# Patient Record
Sex: MALE | Race: WHITE | NOT HISPANIC OR LATINO | Employment: FULL TIME | URBAN - NONMETROPOLITAN AREA
[De-identification: names, ages, dates, MRNs, and addresses within clinical notes are randomized per-mention and may not be internally consistent; named-entity substitution may affect disease eponyms.]

---

## 2023-07-09 ENCOUNTER — HOSPITAL ENCOUNTER (EMERGENCY)
Facility: HOSPITAL | Age: 43
Discharge: PSYCHIATRIC OR SUBSTANCE USE FACILITY | DRG: 897 | End: 2023-07-10
Attending: STUDENT IN AN ORGANIZED HEALTH CARE EDUCATION/TRAINING PROGRAM | Admitting: STUDENT IN AN ORGANIZED HEALTH CARE EDUCATION/TRAINING PROGRAM
Payer: MEDICAID

## 2023-07-09 DIAGNOSIS — F10.920 ALCOHOLIC INTOXICATION WITHOUT COMPLICATION: Primary | ICD-10-CM

## 2023-07-09 DIAGNOSIS — R45.851 SUICIDAL IDEATION: ICD-10-CM

## 2023-07-09 LAB
ALBUMIN SERPL-MCNC: 4.5 G/DL (ref 3.5–5.2)
ALBUMIN/GLOB SERPL: 1.1 G/DL
ALP SERPL-CCNC: 105 U/L (ref 39–117)
ALT SERPL W P-5'-P-CCNC: 26 U/L (ref 1–41)
AMPHET+METHAMPHET UR QL: NEGATIVE
AMPHETAMINES UR QL: NEGATIVE
ANION GAP SERPL CALCULATED.3IONS-SCNC: 16.5 MMOL/L (ref 5–15)
AST SERPL-CCNC: 117 U/L (ref 1–40)
BARBITURATES UR QL SCN: NEGATIVE
BASOPHILS # BLD AUTO: 0.09 10*3/MM3 (ref 0–0.2)
BASOPHILS NFR BLD AUTO: 1.1 % (ref 0–1.5)
BENZODIAZ UR QL SCN: NEGATIVE
BILIRUB SERPL-MCNC: 0.4 MG/DL (ref 0–1.2)
BILIRUB UR QL STRIP: NEGATIVE
BUN SERPL-MCNC: 10 MG/DL (ref 6–20)
BUN/CREAT SERPL: 15.6 (ref 7–25)
BUPRENORPHINE SERPL-MCNC: NEGATIVE NG/ML
CALCIUM SPEC-SCNC: 9.4 MG/DL (ref 8.6–10.5)
CANNABINOIDS SERPL QL: NEGATIVE
CHLORIDE SERPL-SCNC: 102 MMOL/L (ref 98–107)
CLARITY UR: CLEAR
CO2 SERPL-SCNC: 24.5 MMOL/L (ref 22–29)
COCAINE UR QL: NEGATIVE
COLOR UR: YELLOW
CREAT SERPL-MCNC: 0.64 MG/DL (ref 0.76–1.27)
DEPRECATED RDW RBC AUTO: 49.1 FL (ref 37–54)
EGFRCR SERPLBLD CKD-EPI 2021: 120.5 ML/MIN/1.73
EOSINOPHIL # BLD AUTO: 0.15 10*3/MM3 (ref 0–0.4)
EOSINOPHIL NFR BLD AUTO: 1.8 % (ref 0.3–6.2)
ERYTHROCYTE [DISTWIDTH] IN BLOOD BY AUTOMATED COUNT: 13.1 % (ref 12.3–15.4)
ETHANOL BLD-MCNC: 326 MG/DL (ref 0–10)
ETHANOL UR QL: 0.33 %
FENTANYL UR-MCNC: NEGATIVE NG/ML
FLUAV RNA RESP QL NAA+PROBE: NOT DETECTED
FLUBV RNA ISLT QL NAA+PROBE: NOT DETECTED
GLOBULIN UR ELPH-MCNC: 4 GM/DL
GLUCOSE SERPL-MCNC: 85 MG/DL (ref 65–99)
GLUCOSE UR STRIP-MCNC: NEGATIVE MG/DL
HCT VFR BLD AUTO: 46.4 % (ref 37.5–51)
HGB BLD-MCNC: 15.4 G/DL (ref 13–17.7)
HGB UR QL STRIP.AUTO: NEGATIVE
IMM GRANULOCYTES # BLD AUTO: 0.02 10*3/MM3 (ref 0–0.05)
IMM GRANULOCYTES NFR BLD AUTO: 0.2 % (ref 0–0.5)
KETONES UR QL STRIP: NEGATIVE
LEUKOCYTE ESTERASE UR QL STRIP.AUTO: NEGATIVE
LYMPHOCYTES # BLD AUTO: 3.13 10*3/MM3 (ref 0.7–3.1)
LYMPHOCYTES NFR BLD AUTO: 36.9 % (ref 19.6–45.3)
MCH RBC QN AUTO: 33.6 PG (ref 26.6–33)
MCHC RBC AUTO-ENTMCNC: 33.2 G/DL (ref 31.5–35.7)
MCV RBC AUTO: 101.1 FL (ref 79–97)
METHADONE UR QL SCN: NEGATIVE
MONOCYTES # BLD AUTO: 0.37 10*3/MM3 (ref 0.1–0.9)
MONOCYTES NFR BLD AUTO: 4.4 % (ref 5–12)
NEUTROPHILS NFR BLD AUTO: 4.73 10*3/MM3 (ref 1.7–7)
NEUTROPHILS NFR BLD AUTO: 55.6 % (ref 42.7–76)
NITRITE UR QL STRIP: NEGATIVE
NRBC BLD AUTO-RTO: 0 /100 WBC (ref 0–0.2)
OPIATES UR QL: NEGATIVE
OXYCODONE UR QL SCN: NEGATIVE
PCP UR QL SCN: NEGATIVE
PH UR STRIP.AUTO: 5.5 [PH] (ref 5–8)
PLATELET # BLD AUTO: 223 10*3/MM3 (ref 140–450)
PMV BLD AUTO: 8.8 FL (ref 6–12)
POTASSIUM SERPL-SCNC: 4 MMOL/L (ref 3.5–5.2)
PROPOXYPH UR QL: NEGATIVE
PROT SERPL-MCNC: 8.5 G/DL (ref 6–8.5)
PROT UR QL STRIP: NEGATIVE
RBC # BLD AUTO: 4.59 10*6/MM3 (ref 4.14–5.8)
SARS-COV-2 RNA RESP QL NAA+PROBE: NOT DETECTED
SODIUM SERPL-SCNC: 143 MMOL/L (ref 136–145)
SP GR UR STRIP: 1.01 (ref 1–1.03)
TRICYCLICS UR QL SCN: NEGATIVE
UROBILINOGEN UR QL STRIP: NORMAL
WBC NRBC COR # BLD: 8.49 10*3/MM3 (ref 3.4–10.8)

## 2023-07-09 PROCEDURE — 81003 URINALYSIS AUTO W/O SCOPE: CPT | Performed by: STUDENT IN AN ORGANIZED HEALTH CARE EDUCATION/TRAINING PROGRAM

## 2023-07-09 PROCEDURE — 36415 COLL VENOUS BLD VENIPUNCTURE: CPT

## 2023-07-09 PROCEDURE — 87636 SARSCOV2 & INF A&B AMP PRB: CPT | Performed by: STUDENT IN AN ORGANIZED HEALTH CARE EDUCATION/TRAINING PROGRAM

## 2023-07-09 PROCEDURE — 96372 THER/PROPH/DIAG INJ SC/IM: CPT

## 2023-07-09 PROCEDURE — 99285 EMERGENCY DEPT VISIT HI MDM: CPT

## 2023-07-09 PROCEDURE — 80053 COMPREHEN METABOLIC PANEL: CPT | Performed by: STUDENT IN AN ORGANIZED HEALTH CARE EDUCATION/TRAINING PROGRAM

## 2023-07-09 PROCEDURE — 25010000002 CYANOCOBALAMIN PER 1000 MCG: Performed by: STUDENT IN AN ORGANIZED HEALTH CARE EDUCATION/TRAINING PROGRAM

## 2023-07-09 PROCEDURE — 80307 DRUG TEST PRSMV CHEM ANLYZR: CPT | Performed by: STUDENT IN AN ORGANIZED HEALTH CARE EDUCATION/TRAINING PROGRAM

## 2023-07-09 PROCEDURE — 82077 ASSAY SPEC XCP UR&BREATH IA: CPT | Performed by: STUDENT IN AN ORGANIZED HEALTH CARE EDUCATION/TRAINING PROGRAM

## 2023-07-09 PROCEDURE — 85025 COMPLETE CBC W/AUTO DIFF WBC: CPT | Performed by: STUDENT IN AN ORGANIZED HEALTH CARE EDUCATION/TRAINING PROGRAM

## 2023-07-09 RX ORDER — FOLIC ACID 1 MG/1
1 TABLET ORAL ONCE
Status: COMPLETED | OUTPATIENT
Start: 2023-07-09 | End: 2023-07-09

## 2023-07-09 RX ORDER — CYANOCOBALAMIN 1000 UG/ML
1000 INJECTION, SOLUTION INTRAMUSCULAR; SUBCUTANEOUS ONCE
Status: COMPLETED | OUTPATIENT
Start: 2023-07-09 | End: 2023-07-09

## 2023-07-09 RX ORDER — DIPHENOXYLATE HYDROCHLORIDE AND ATROPINE SULFATE 2.5; .025 MG/1; MG/1
1 TABLET ORAL ONCE
Status: COMPLETED | OUTPATIENT
Start: 2023-07-09 | End: 2023-07-09

## 2023-07-09 RX ORDER — METHION/INOS/CHOL BT/B COM/LIV 110MG-86MG
250 CAPSULE ORAL ONCE
Status: COMPLETED | OUTPATIENT
Start: 2023-07-09 | End: 2023-07-09

## 2023-07-09 RX ORDER — NICOTINE 21 MG/24HR
1 PATCH, TRANSDERMAL 24 HOURS TRANSDERMAL ONCE
Status: DISCONTINUED | OUTPATIENT
Start: 2023-07-10 | End: 2023-07-10 | Stop reason: HOSPADM

## 2023-07-09 RX ADMIN — Medication 1 MG: at 20:18

## 2023-07-09 RX ADMIN — Medication 1 TABLET: at 20:18

## 2023-07-09 RX ADMIN — Medication 250 MG: at 20:18

## 2023-07-09 RX ADMIN — CYANOCOBALAMIN 1000 MCG: 1000 INJECTION, SOLUTION INTRAMUSCULAR at 20:20

## 2023-07-09 RX ADMIN — MAGNESIUM GLUCONATE 500 MG ORAL TABLET 400 MG: 500 TABLET ORAL at 20:18

## 2023-07-09 NOTE — ED PROVIDER NOTES
[facility]  emergency department encounter        Pt Name: Yadiel Silva  MRN: [id number]  Birthdate 1980  Date of evaluation: 7/11/2023    Chief Complaint   Patient presents with    Alcohol Problem    Suicidal             HISTORY OF PRESENT ILLNESS:   Yadiel Silva is a 43 y.o. male presents for detox from alcohol, Suboxone and endorsing suicidal ideation.    Patient reports he drinks 1/5 of liquor daily, last drink 30 minutes prior to arrival.  Patient has been on prescribed Suboxone for the last few years, denies illicit drug use.  Patient has down titrated his Suboxone from 8 mg to 2 mg over past 2 couple weeks and discontinued Suboxone completely 5 days ago.  Patient endorses cold feet, blurred vision but otherwise has no active ROS complaints.  Patient denies prior self-harm but endorses plan to commit suicide and that he even bought paracord to hang himself.                PCP: Provider, No Known          REVIEW OF SYSTEMS:     Review of Systems   Constitutional:  Negative for fever.        Cold feet   HENT:  Negative for congestion and rhinorrhea.    Eyes:  Positive for visual disturbance.   Respiratory:  Negative for cough and shortness of breath.    Cardiovascular:  Negative for chest pain.   Gastrointestinal:  Negative for abdominal pain, nausea and vomiting.   Genitourinary:  Negative for dysuria.   Musculoskeletal:  Negative for myalgias.   Skin:  Negative for rash.   Neurological:  Negative for headaches.   Psychiatric/Behavioral:  Negative for confusion.      Review of systems otherwise as per HPI.          PREVIOUS HISTORY:         Past Medical History:   Diagnosis Date    Alcohol abuse     Withdrawal symptoms, alcohol            Past Surgical History:   Procedure Laterality Date    FOREIGN BODY REMOVAL      exploratory surgery in neck to remove glass 2002           Social History     Socioeconomic History    Marital status:    Tobacco Use    Smoking status: Never     Smokeless tobacco: Current     Types: Snuff   Vaping Use    Vaping Use: Never used   Substance and Sexual Activity    Alcohol use: Yes     Comment: 5th or more daily x 4-5 yrs. Last drink 7/9/23    Drug use: Yes     Comment: Suboxone    Sexual activity: Defer           Family History   Problem Relation Age of Onset    Alcohol abuse Father     Seizures Sister     Drug abuse Other          No current outpatient medications      Allergies:  Patient has no known allergies.          PHYSICAL EXAM:     Patient Vitals for the past 24 hrs:   BP Temp Temp src Pulse Resp SpO2   07/10/23 0700 121/81 -- -- 88 -- --   07/10/23 0601 122/86 97.9 °F (36.6 °C) Infrared 96 17 100 %       Physical Exam  Vitals and nursing note reviewed.   Constitutional:       General: He is not in acute distress.     Appearance: Normal appearance.   HENT:      Head: Normocephalic and atraumatic.   Eyes:      Extraocular Movements: Extraocular movements intact.      Conjunctiva/sclera: Conjunctivae normal.   Pulmonary:      Effort: Pulmonary effort is normal. No respiratory distress.   Abdominal:      General: Abdomen is flat. There is no distension.   Musculoskeletal:         General: No deformity. Normal range of motion.      Cervical back: Normal range of motion. No rigidity.   Skin:     Coloration: Skin is not jaundiced.      Findings: No rash.   Neurological:      General: No focal deficit present.      Mental Status: He is alert and oriented to person, place, and time.      Comments: Mildly slurred speech   Psychiatric:         Mood and Affect: Affect is not tearful.         Behavior: Behavior is cooperative.         Thought Content: Thought content includes suicidal ideation. Thought content includes suicidal plan.           COMPLETED DIAGNOSTIC STUDIES AND INTERVENTIONS:     Lab Results (last 24 hours)       Procedure Component Value Units Date/Time    Ethanol [[id number]]  (Abnormal) Collected: 07/10/23 0553    Specimen: Blood Updated:  07/10/23 0611     Ethanol 64 mg/dL      Ethanol % 0.064 %     Narrative:      >/= 80.0 legally intoxicated    Magnesium [[id number]]  (Normal) Collected: 07/10/23 0553    Specimen: Blood Updated: 07/10/23 0722     Magnesium 2.0 mg/dL               No orders to display         New Medications Ordered This Visit   Medications    thiamine (VITAMIN B-1) tablet 250 mg    folic acid (FOLVITE) tablet 1 mg    cyanocobalamin injection 1,000 mcg    magnesium oxide (MAG-OX) tablet 400 mg    multivitamin (THERAGRAN) tablet 1 tablet    LORazepam (ATIVAN) tablet 2 mg         Procedures            MEDICAL DECISION-MAKING AND ED COURSE:     ED Course as of 07/11/23 0449   Sun Jul 09, 2023 1955 43-year-old male presents intoxicated for detox from alcohol, Suboxone and endorsing suicidal ideation.  Patient has titrated down to Suboxone over the past couple of weeks.  Administer thiamine, vitamin replacement.  Labs pending.  No significant symptoms of withdrawal. [KP]   2045 Ethanol %: 0.326 [KP]   Mon Jul 10, 2023   0417 Ethanol %: 0.113 [KP]   Tue Jul 11, 2023 0448 Patient admitted to psych/detox. [KP]      ED Course User Index  [KP] Ernst Cantor MD       ?      FINAL IMPRESSION:       1. Alcoholic intoxication without complication    2. Suicidal ideation         The complaints listed here are new problems to this examiner.       Medication List      No changes were made to your prescriptions during this visit.         FOLLOW-UP  No follow-up provider specified.    DISPOSITION  ED Disposition       ED Disposition   DC/Transfer to Behavioral Health Condition   Stable    Comment   --                   Please note that portions of this note were completed with a voice recognition program.      Ernst Cantor MD  04:49 EDT  7/11/2023               Ernst Cantor MD  07/11/23 0449

## 2023-07-10 ENCOUNTER — HOSPITAL ENCOUNTER (INPATIENT)
Facility: HOSPITAL | Age: 43
LOS: 5 days | Discharge: HOME | DRG: 897 | End: 2023-07-15
Attending: PSYCHIATRY & NEUROLOGY | Admitting: PSYCHIATRY & NEUROLOGY
Payer: MEDICAID

## 2023-07-10 VITALS
HEART RATE: 88 BPM | OXYGEN SATURATION: 100 % | RESPIRATION RATE: 17 BRPM | WEIGHT: 138 LBS | SYSTOLIC BLOOD PRESSURE: 121 MMHG | DIASTOLIC BLOOD PRESSURE: 81 MMHG | HEIGHT: 68 IN | TEMPERATURE: 97.9 F | BODY MASS INDEX: 20.92 KG/M2

## 2023-07-10 PROBLEM — F19.10 POLYSUBSTANCE ABUSE: Status: ACTIVE | Noted: 2023-07-10

## 2023-07-10 PROBLEM — F11.20 OPIOID USE DISORDER, SEVERE, DEPENDENCE: Status: ACTIVE | Noted: 2023-07-10

## 2023-07-10 PROBLEM — F10.20 ALCOHOL USE DISORDER, SEVERE, DEPENDENCE: Status: ACTIVE | Noted: 2023-07-10

## 2023-07-10 LAB
ETHANOL BLD-MCNC: 113 MG/DL (ref 0–10)
ETHANOL BLD-MCNC: 64 MG/DL (ref 0–10)
ETHANOL UR QL: 0.06 %
ETHANOL UR QL: 0.11 %
MAGNESIUM SERPL-MCNC: 2 MG/DL (ref 1.6–2.6)
QT INTERVAL: 362 MS
QTC INTERVAL: 445 MS

## 2023-07-10 PROCEDURE — 99222 1ST HOSP IP/OBS MODERATE 55: CPT | Performed by: PSYCHIATRY & NEUROLOGY

## 2023-07-10 PROCEDURE — 93005 ELECTROCARDIOGRAM TRACING: CPT | Performed by: PSYCHIATRY & NEUROLOGY

## 2023-07-10 PROCEDURE — 83735 ASSAY OF MAGNESIUM: CPT | Performed by: STUDENT IN AN ORGANIZED HEALTH CARE EDUCATION/TRAINING PROGRAM

## 2023-07-10 PROCEDURE — 63710000001 ONDANSETRON PER 8 MG: Performed by: PSYCHIATRY & NEUROLOGY

## 2023-07-10 PROCEDURE — 82077 ASSAY SPEC XCP UR&BREATH IA: CPT | Performed by: STUDENT IN AN ORGANIZED HEALTH CARE EDUCATION/TRAINING PROGRAM

## 2023-07-10 RX ORDER — FAMOTIDINE 20 MG/1
20 TABLET, FILM COATED ORAL 2 TIMES DAILY PRN
Status: DISCONTINUED | OUTPATIENT
Start: 2023-07-10 | End: 2023-07-15 | Stop reason: HOSPADM

## 2023-07-10 RX ORDER — LORAZEPAM 2 MG/1
2 TABLET ORAL ONCE
Status: COMPLETED | OUTPATIENT
Start: 2023-07-10 | End: 2023-07-10

## 2023-07-10 RX ORDER — LORAZEPAM 2 MG/1
2 TABLET ORAL
Status: DISPENSED | OUTPATIENT
Start: 2023-07-10 | End: 2023-07-11

## 2023-07-10 RX ORDER — LORAZEPAM 0.5 MG/1
0.5 TABLET ORAL EVERY 4 HOURS PRN
Status: ACTIVE | OUTPATIENT
Start: 2023-07-14 | End: 2023-07-15

## 2023-07-10 RX ORDER — TRAZODONE HYDROCHLORIDE 50 MG/1
50 TABLET ORAL NIGHTLY PRN
Status: DISCONTINUED | OUTPATIENT
Start: 2023-07-10 | End: 2023-07-15 | Stop reason: HOSPADM

## 2023-07-10 RX ORDER — BENZTROPINE MESYLATE 1 MG/ML
1 INJECTION INTRAMUSCULAR; INTRAVENOUS ONCE AS NEEDED
Status: DISCONTINUED | OUTPATIENT
Start: 2023-07-10 | End: 2023-07-15 | Stop reason: HOSPADM

## 2023-07-10 RX ORDER — ONDANSETRON 4 MG/1
4 TABLET, FILM COATED ORAL EVERY 6 HOURS PRN
Status: DISCONTINUED | OUTPATIENT
Start: 2023-07-10 | End: 2023-07-15 | Stop reason: HOSPADM

## 2023-07-10 RX ORDER — BENZTROPINE MESYLATE 1 MG/1
2 TABLET ORAL ONCE AS NEEDED
Status: DISCONTINUED | OUTPATIENT
Start: 2023-07-10 | End: 2023-07-15 | Stop reason: HOSPADM

## 2023-07-10 RX ORDER — LORAZEPAM 1 MG/1
1 TABLET ORAL
Status: COMPLETED | OUTPATIENT
Start: 2023-07-13 | End: 2023-07-13

## 2023-07-10 RX ORDER — MULTIVITAMIN WITH IRON
2 TABLET ORAL DAILY
Status: DISCONTINUED | OUTPATIENT
Start: 2023-07-10 | End: 2023-07-15 | Stop reason: HOSPADM

## 2023-07-10 RX ORDER — ECHINACEA PURPUREA EXTRACT 125 MG
2 TABLET ORAL AS NEEDED
Status: DISCONTINUED | OUTPATIENT
Start: 2023-07-10 | End: 2023-07-15 | Stop reason: HOSPADM

## 2023-07-10 RX ORDER — LOPERAMIDE HYDROCHLORIDE 2 MG/1
2 CAPSULE ORAL
Status: DISCONTINUED | OUTPATIENT
Start: 2023-07-10 | End: 2023-07-15 | Stop reason: HOSPADM

## 2023-07-10 RX ORDER — LORAZEPAM 2 MG/1
2 TABLET ORAL EVERY 4 HOURS PRN
Status: ACTIVE | OUTPATIENT
Start: 2023-07-11 | End: 2023-07-12

## 2023-07-10 RX ORDER — ALUMINA, MAGNESIA, AND SIMETHICONE 2400; 2400; 240 MG/30ML; MG/30ML; MG/30ML
15 SUSPENSION ORAL EVERY 6 HOURS PRN
Status: DISCONTINUED | OUTPATIENT
Start: 2023-07-10 | End: 2023-07-15 | Stop reason: HOSPADM

## 2023-07-10 RX ORDER — LORAZEPAM 1 MG/1
1 TABLET ORAL EVERY 4 HOURS PRN
Status: ACTIVE | OUTPATIENT
Start: 2023-07-13 | End: 2023-07-14

## 2023-07-10 RX ORDER — MULTIPLE VITAMINS W/ MINERALS TAB 9MG-400MCG
1 TAB ORAL DAILY
Status: DISCONTINUED | OUTPATIENT
Start: 2023-07-10 | End: 2023-07-15 | Stop reason: HOSPADM

## 2023-07-10 RX ORDER — HYDROXYZINE 50 MG/1
50 TABLET, FILM COATED ORAL EVERY 6 HOURS PRN
Status: DISCONTINUED | OUTPATIENT
Start: 2023-07-10 | End: 2023-07-15 | Stop reason: HOSPADM

## 2023-07-10 RX ORDER — BENZONATATE 100 MG/1
100 CAPSULE ORAL 3 TIMES DAILY PRN
Status: DISCONTINUED | OUTPATIENT
Start: 2023-07-10 | End: 2023-07-15 | Stop reason: HOSPADM

## 2023-07-10 RX ORDER — LORAZEPAM 0.5 MG/1
0.5 TABLET ORAL
Status: COMPLETED | OUTPATIENT
Start: 2023-07-14 | End: 2023-07-14

## 2023-07-10 RX ORDER — LORAZEPAM 2 MG/1
2 TABLET ORAL
Status: COMPLETED | OUTPATIENT
Start: 2023-07-11 | End: 2023-07-11

## 2023-07-10 RX ORDER — IBUPROFEN 400 MG/1
400 TABLET ORAL EVERY 6 HOURS PRN
Status: DISCONTINUED | OUTPATIENT
Start: 2023-07-10 | End: 2023-07-15 | Stop reason: HOSPADM

## 2023-07-10 RX ORDER — ACETAMINOPHEN 325 MG/1
650 TABLET ORAL EVERY 6 HOURS PRN
Status: DISCONTINUED | OUTPATIENT
Start: 2023-07-10 | End: 2023-07-15 | Stop reason: HOSPADM

## 2023-07-10 RX ADMIN — HYDROXYZINE HYDROCHLORIDE 50 MG: 50 TABLET ORAL at 14:22

## 2023-07-10 RX ADMIN — LORAZEPAM 2 MG: 2 TABLET ORAL at 01:45

## 2023-07-10 RX ADMIN — Medication 2 TABLET: at 08:34

## 2023-07-10 RX ADMIN — HYDROXYZINE HYDROCHLORIDE 50 MG: 50 TABLET ORAL at 08:34

## 2023-07-10 RX ADMIN — ONDANSETRON HYDROCHLORIDE 4 MG: 4 TABLET, FILM COATED ORAL at 08:34

## 2023-07-10 RX ADMIN — Medication 1 PATCH: at 00:15

## 2023-07-10 RX ADMIN — Medication 100 MG: at 08:35

## 2023-07-10 RX ADMIN — Medication 1 TABLET: at 08:34

## 2023-07-10 RX ADMIN — LORAZEPAM 2 MG: 2 TABLET ORAL at 14:22

## 2023-07-10 NOTE — ED NOTES
Relieved ED tech for 1 on 1 observation, aware of suicide precautions, 15 min safety checks will be performed.

## 2023-07-10 NOTE — NURSING NOTE
Presented pt to Dr. Lamar. New order to admit patient to CD on observation status. SP4. Routine orders. Rbovx2.

## 2023-07-10 NOTE — NURSING NOTE
Pt to intake. Search completed in ED with 2 staff members present. Pt educated about mask and encouraged to keep mask on in intake area if having signs/symptoms of COVID. Items placed in cabinet prior to pt coming to Intake. Pt to be assessed when ETOH redraw results.

## 2023-07-10 NOTE — PLAN OF CARE
Problem: Adult Behavioral Health Plan of Care  Goal: Plan of Care Review  Outcome: Ongoing, Progressing  Flowsheets (Taken 7/10/2023 1044)  Progress: no change  Plan of Care Reviewed With: patient  Patient Agreement with Plan of Care: agrees  Outcome Evaluation: New admit   Goal Outcome Evaluation:  Plan of Care Reviewed With: patient  Patient Agreement with Plan of Care: agrees     Progress: no change  Outcome Evaluation: New admit

## 2023-07-10 NOTE — ED NOTES
Pt reports double-vision, provider and RN currently in acuity 1 emergency, will report symptoms when available.

## 2023-07-10 NOTE — NURSING NOTE
"Intake assessment completed at this time. Pt presents to Intake after being medically cleared by ED. Pt brought to Intake by daughter for detox from Suboxone and alcohol. Pt states he had planned to not drink, but he just couldn't stand the withdrawals. Pt is beginning to feel the withdrawals from the Suboxone now, as he states they take 4-5 days to \"kick in\". Pt reports drinking a 5th or more of alcohol daily since his divorce in 2019 and taking a quarter of a Suboxone tab \"for the last couple years\". Last used Suboxone on Wed, 7/5/23. Last drink was prior to arrival to ED. Pt states \"When I got here, I said I was gonna kill myself, but I think it was just over the anxiety of coming here. I don't feel that way anymore. I bought paracord and stuff to hang myself in 2019 and I still have it, but my son lives with me and if I hung myself he would find me. That's not something a kid needs to see. Also, my daughter told me she had a dream that I killed myself.\" Pt reports sometimes wishing to be dead or to just have this all over, but denies any recent thoughts of taking his own life, homicide, or hallucinations, denies any Hx of psychiatric illness or DTs. Pt states his brother is helping him get off alcohol and spoke with a rehab in [place], but they said he needs detox before he can go. Pt unsure of the name of the rehab, but says his brother has the information and that's where he plans to go on discharge.    Labs    ETOH 64 down from 326    Anxiety 3 depression 3 craving 8 on scale of 0-10.  Sleep & appetite poor.  Denies any SI/HI/AVH.     COWS 7  CIWA 5    Meds given B-12, folic acid, Ativan 2mg, Mag-Ox, multivitamin, thiamine, and Nicotine patch on Rt arm.     Pt A&Ox 4.  "

## 2023-07-10 NOTE — H&P
INITIAL PSYCHIATRIC HISTORY & PHYSICAL    Patient Identification:  Name:  Yadiel Silva  Age:  43 y.o.  Sex:  male  :  1980  MRN:  [id number]   Visit Number:  [id number]  Primary Care Physician:  Provider, No Known    SUBJECTIVE    CC/Focus of Exam: alcohol and opioid use    HPI: Yadiel Silva is a 43 y.o. male who was admitted on 7/10/2023 with complaints of alcohol and opioid use and withdrawals. The patient reports a long history of substance use. First use was at age 16 when he started using marijuana, and would drink occasionally at that time and started taking pain pills in early 20s. Over time the use increased and the patient  continued to use despite negative consequences. The patient endorses symptoms of tolerance and withdrawals. Has tried to cut down and stop but has not been successful. Spends too much time and resources in pursuit of substance use. Longest period of sobriety is reported to be 5 days..  Currently using a fifth of Sen Bean daily, and about 2 mg of Suboxone daily orally.   Last use of alcohol was last night about 15 hours ago, and Suboxone 2 mg was about five days ago.   Withdrawal symptoms mild tremors, chills.     PAST PSYCHIATRIC HX: None reported.     SUBSTANCE USE HX: See HPI.     SOCIAL HX:   Social History     Socioeconomic History    Marital status:    Tobacco Use    Smoking status: Never    Smokeless tobacco: Current     Types: Snuff   Vaping Use    Vaping Use: Never used   Substance and Sexual Activity    Alcohol use: Yes     Comment: 5th or more daily x 4-5 yrs. Last drink 23    Drug use: Yes     Comment: Suboxone    Sexual activity: Defer         Past Medical History:   Diagnosis Date    Alcohol abuse     Withdrawal symptoms, alcohol           Past Surgical History:   Procedure Laterality Date    FOREIGN BODY REMOVAL      exploratory surgery in neck to remove glass        Family History   Problem Relation Age of Onset    Alcohol abuse Father      Seizures Sister     Drug abuse Other          No medications prior to admission.         ALLERGIES:  Patient has no known allergies.    Temp:  [97.9 °F (36.6 °C)-98.3 °F (36.8 °C)] 98.3 °F (36.8 °C)  Heart Rate:  [73-96] 95  Resp:  [16-18] 18  BP: ()/(57-99) 138/94    REVIEW OF SYSTEMS:  Review of Systems   Constitutional:  Positive for chills and diaphoresis.   HENT: Negative.     Eyes: Negative.    Respiratory: Negative.     Cardiovascular: Negative.    Gastrointestinal: Negative.    Endocrine: Negative.    Genitourinary: Negative.    Musculoskeletal: Negative.    Skin: Negative.    Neurological:  Positive for tremors.   Hematological: Negative.    Psychiatric/Behavioral: Negative.  Positive for dysphoric mood. The patient is nervous/anxious.       OBJECTIVE    PHYSICAL EXAM:  Physical Exam  Constitutional:  Appears well-developed and well-nourished.   HENT:   Head: Normocephalic and atraumatic.   Right Ear: External ear normal.   Left Ear: External ear normal.   Mouth/Throat: Oropharynx is clear and moist.   Eyes: Pupils are equal, round, and reactive to light. Conjunctivae and EOM are normal.   Neck: Normal range of motion. Neck supple.   Cardiovascular: Normal rate, regular rhythm and normal heart sounds.    Respiratory: Effort normal and breath sounds normal. No respiratory distress. No wheezes.   GI: Soft. Bowel sounds are normal.No distension. There is no tenderness.   Musculoskeletal: Normal range of motion. No edema or deformity.   Neurological:No cranial nerve deficit. Coordination normal.   Skin: Skin is warm and dry. No rash noted. No erythema.     MENTAL STATUS EXAM:   Hygiene:   fair  Cooperation:  Cooperative  Eye Contact:  Fair  Psychomotor Behavior:  Appropriate  Affect:  Appropriate  Hopelessness: Denies  Speech:  Normal  Thought Process: Goal directed  Thought Content:  Normal  Suicidal:  None  Homicidal:  None  Hallucinations:  None  Delusion:  None  Memory:  Intact  Orientation:  Person,  Place, Time and Situation  Reliability:  fair  Insight:  Fair  Judgement:  Fair  Impulse Control:  Fair      Imaging Results (Last 24 Hours)       ** No results found for the last 24 hours. **             ECG/EMG Results (most recent)       Procedure Component Value Units Date/Time    ECG 12 Lead Other [[id number]] Collected: 07/10/23 1044     Updated: 07/10/23 1045     QT Interval 362 ms      QTC Interval 445 ms     Narrative:      Test Reason : Other~  Blood Pressure :   */*   mmHG  Vent. Rate :  91 BPM     Atrial Rate :  91 BPM     P-R Int : 148 ms          QRS Dur :  96 ms      QT Int : 362 ms       P-R-T Axes :  49  48  57 degrees     QTc Int : 445 ms    Normal sinus rhythm  Normal ECG  No previous ECGs available    Referred By:            Confirmed By:              Lab Results   Component Value Date    GLUCOSE 85 07/09/2023    BUN 10 07/09/2023    CREATININE 0.64 (L) 07/09/2023    BCR 15.6 07/09/2023    CO2 24.5 07/09/2023    CALCIUM 9.4 07/09/2023    ALBUMIN 4.5 07/09/2023     (H) 07/09/2023    ALT 26 07/09/2023       Lab Results   Component Value Date    WBC 8.49 07/09/2023    HGB 15.4 07/09/2023    HCT 46.4 07/09/2023    .1 (H) 07/09/2023     07/09/2023       Last Urine Toxicity          Latest Ref Rng & Units 7/9/2023   LAST URINE TOXICITY RESULTS   Amphetamine, Urine Qual Negative Negative    Barbiturates Screen, Urine Negative Negative    Benzodiazepine Screen, Urine Negative Negative    Buprenorphine, Screen, Urine Negative Negative    Cocaine Screen, Urine Negative Negative    Fentanyl, Urine Negative Negative    Methadone Screen , Urine Negative Negative    Methamphetamine, Ur Negative Negative        Brief Urine Lab Results  (Last result in the past 365 days)        Color   Clarity   Blood   Leuk Est   Nitrite   Protein   CREAT   Urine HCG        07/09/23 2014 Yellow   Clear   Negative   Negative   Negative   Negative                   DATA  Labs reviewed. , MCV  101.1, MCH 33.6. Blood alcohol level 326 mg/dL at the time of presentation. UDS negative.   EKG reviewed. QTc interval 445 ms.   LEONEL reviewed.   Record reviewed. No previous treatment noted in this hospital for mental health or substance use problems.       Strengths: Motivated for treatment    Weaknesses:Substance use and Poor coping skills    Code status:  Full  Discussed code status with patient.    ASSESSMENT & PLAN:        Alcohol use disorder, severe, dependence  -The patient has been drinking a fifth of liquor daily and last use was about 15 hours ago. He has started to exhibit some signs of withdrawals and given his heavy use he is likely to develop severe withdrawals. Will change status to inpatient and start Ativan detox.      Opioid use disorder, severe, dependence  -Patient has not used any in about five day and may not need a detox. Continue to monitor.       The patient has been admitted for safety and stabilization.  Patient will be monitored for suicidality daily and maintained on Special Precautions Level 4 (q30 min checks).  The patient will have individual and group therapy with a master's level therapist. A master treatment plan will be developed and agreed upon by the patient and his/her treatment team.  The patient's estimated length of stay in the hospital is 5-7 days.

## 2023-07-10 NOTE — ED NOTES
Patient placed in paper scrubs. All belongings searched and sent to intake department. Patients wallet and phone sent to security. Suicide precautions in place. Sitter @ bedside.

## 2023-07-10 NOTE — NURSING NOTE
Pt lying on lounger in ED5 with eyes closed, respirations ENL. No distress noted, no complaints voiced.

## 2023-07-11 PROCEDURE — 99232 SBSQ HOSP IP/OBS MODERATE 35: CPT | Performed by: PSYCHIATRY & NEUROLOGY

## 2023-07-11 RX ADMIN — TRAZODONE HYDROCHLORIDE 50 MG: 50 TABLET ORAL at 21:05

## 2023-07-11 RX ADMIN — IBUPROFEN 400 MG: 400 TABLET, FILM COATED ORAL at 21:05

## 2023-07-11 RX ADMIN — LOPERAMIDE HYDROCHLORIDE 2 MG: 2 CAPSULE ORAL at 21:05

## 2023-07-11 RX ADMIN — LORAZEPAM 2 MG: 2 TABLET ORAL at 14:31

## 2023-07-11 RX ADMIN — LORAZEPAM 2 MG: 2 TABLET ORAL at 21:05

## 2023-07-11 RX ADMIN — Medication 2 TABLET: at 08:06

## 2023-07-11 RX ADMIN — Medication 1 TABLET: at 08:06

## 2023-07-11 RX ADMIN — LORAZEPAM 2 MG: 2 TABLET ORAL at 08:06

## 2023-07-11 RX ADMIN — Medication 100 MG: at 08:06

## 2023-07-11 RX ADMIN — HYDROXYZINE HYDROCHLORIDE 50 MG: 50 TABLET ORAL at 08:06

## 2023-07-11 NOTE — PROGRESS NOTES
"INPATIENT PSYCHIATRIC PROGRESS NOTE    Name:  Yadiel Silva  :  1980  MRN:  [id number]  Visit Number:  [id number]  Length of stay:  1    SUBJECTIVE    CC/Focus of Exam: alcohol use    INTERVAL HISTORY:  The patient states he is feeling some better today. States he felt his bed was shaking last night but then he realized it was his own body shaking.   Depression rating 5/10  Anxiety rating 7/10  Sleep:not good  Withdrawal sx: shakes, cramps, chills  Cravin/10    Review of Systems   Constitutional:  Positive for chills and fatigue.   Respiratory: Negative.     Cardiovascular: Negative.    Neurological:  Positive for tremors.   Psychiatric/Behavioral:  The patient is nervous/anxious.      OBJECTIVE    Temp:  [96.4 °F (35.8 °C)-97.8 °F (36.6 °C)] 96.4 °F (35.8 °C)  Heart Rate:  [] 77  Resp:  [16-20] 18  BP: ()/(62-88) 133/88    MENTAL STATUS EXAM:  Appearance:Casually dressed, good hygeine.   Cooperation:Cooperative  Psychomotor: No psychomotor agitation/retardation, No EPS, No motor tics  Speech-normal rate, amount.  Mood \"anxious\"   Affect-congruent, appropriate, stable  Thought Content-goal directed, no delusional material present  Thought process-linear, organized.  Suicidality: No SI  Homicidality: No HI  Perception: No AH/VH  Insight-fair   Judgement-fair    Lab Results (last 24 hours)       ** No results found for the last 24 hours. **               Imaging Results (Last 24 Hours)       ** No results found for the last 24 hours. **               ECG/EMG Results (most recent)       Procedure Component Value Units Date/Time    ECG 12 Lead Other [[id number]] Collected: 07/10/23 1044     Updated: 07/10/23 2017     QT Interval 362 ms      QTC Interval 445 ms     Narrative:      Test Reason : Other~  Blood Pressure :   */*   mmHG  Vent. Rate :  91 BPM     Atrial Rate :  91 BPM     P-R Int : 148 ms          QRS Dur :  96 ms      QT Int : 362 ms       P-R-T Axes :  49  48  57 degrees     QTc Int : " 445 ms    Normal sinus rhythm  Normal ECG  No previous ECGs available  Confirmed by Collin Ng ([id number]) on 7/10/2023 8:17:41 PM    Referred By:            Confirmed By: Collin Ng             ALLERGIES: Patient has no known allergies.    Medication Review:   Scheduled Medications:  B-complex with vitamin C, 2 tablet, Oral, Daily  LORazepam, 2 mg, Oral, 3 times per day   Followed by  [START ON 7/12/2023] LORazepam, 1.5 mg, Oral, 3 times per day   Followed by  [START ON 7/13/2023] LORazepam, 1 mg, Oral, 3 times per day   Followed by  [START ON 7/14/2023] LORazepam, 0.5 mg, Oral, 3 times per day  multivitamin with minerals, 1 tablet, Oral, Daily  thiamine, 100 mg, Oral, Daily         PRN Medications:    acetaminophen    aluminum-magnesium hydroxide-simethicone    benzonatate    benztropine **OR** benztropine    famotidine    hydrOXYzine    ibuprofen    loperamide    LORazepam **FOLLOWED BY** [START ON 7/12/2023] LORazepam **FOLLOWED BY** [START ON 7/13/2023] LORazepam **FOLLOWED BY** [START ON 7/14/2023] LORazepam    magnesium hydroxide    ondansetron    sodium chloride    traZODone   All medications reviewed.    ASSESSMENT & PLAN:     Alcohol use disorder, severe, dependence  -Continue Ativan detox.  -Thiamine and folate       Opioid use disorder, severe, dependence  -Patient has not used any in about five days prior to coming to the hospital and may not need a detox. Continue to monitor.     Special precautions: Special Precautions Level 4 (q30 min checks).    Behavioral Health Treatment Plan and Problem List: I have reviewed and approved the Behavioral Health Treatment Plan and Problem list.  The patient has had a chance to review and agrees with the treatment plan.    Copied text in portions of this note has been reviewed and is accurate as of 07/11/23         Clinician:  Art David MD  07/11/23  12:02 EDT

## 2023-07-11 NOTE — PLAN OF CARE
"Goal Outcome Evaluation:  Plan of Care Reviewed With: patient  Patient Agreement with Plan of Care: agrees     Progress: no change  Outcome Evaluation: New admission today, pt c/o \"cold sweat\" as his primary w/d sx. Pt pleasant and cooperative with staff. pt rates anxiety 5/10, depression 6/10 and craving 8/10. Pt denies SI/HI/AVH.  Pt appeared to sleep throughout the night.       "

## 2023-07-11 NOTE — PROGRESS NOTES
Navigator is helping with the following referral:    [facility]ook Beltran - [phone]  -Sent 7/11    [facility] - [phone]  -Attempted to reach staff to screen patient. No answer. 7/11

## 2023-07-11 NOTE — PLAN OF CARE
Goal Outcome Evaluation:        Problem: Adult Behavioral Health Plan of Care  Goal: Patient-Specific Goal (Individualization)  Outcome: Ongoing, Progressing  Flowsheets  Taken 7/11/2023 1605  Patient-Specific Goals (Include Timeframe): Identify 2-3 coping skills, address relapse prevention methods, complete aftercare plans, and deny SI/HI prior to discharge.  Individualized Care Needs: Therapist to offer 1-4 therapy sessions, aftercare planning, safety planning, family education, group therapy, and brief CBT/MI interventions.  Anxieties, Fears or Concerns: none verbalized  Taken 7/11/2023 0940  Patient Personal Strengths:   resilient   resourceful   motivated for recovery   motivated for treatment   independent living skills   positive vocational history   expressive of emotions   expressive of needs   parenting skills   stable living environment   socioeconomic stability   self-reliant  Patient Vulnerabilities:   substance abuse/addiction   history of unsuccessful treatment   poor impulse control  Goal: Optimized Coping Skills in Response to Life Stressors  Outcome: Ongoing, Progressing  Flowsheets (Taken 7/11/2023 1605)  Optimized Coping Skills in Response to Life Stressors: making progress toward outcome  Intervention: Promote Effective Coping Strategies  Flowsheets (Taken 7/11/2023 1605)  Supportive Measures:   active listening utilized   decision-making supported   counseling provided   goal-setting facilitated   verbalization of feelings encouraged   self-responsibility promoted   positive reinforcement provided   self-care encouraged   self-reflection promoted  Goal: Develops/Participates in Therapeutic Bath to Support Successful Transition  Outcome: Ongoing, Progressing  Flowsheets (Taken 7/11/2023 1605)  Develops/Participates in Therapeutic Bath to Support Successful Transition: making progress toward outcome  Intervention: Foster Therapeutic Bath  Flowsheets (Taken 7/11/2023 1605)  Trust  Relationship/Rapport:   care explained   reassurance provided   choices provided   thoughts/feelings acknowledged   emotional support provided   empathic listening provided   questions answered   questions encouraged  Intervention: Mutually Develop Transition Plan  Flowsheets  Taken 7/11/2023 1605  Outpatient/Agency/Support Group Needs: residential services  Transition Support:   follow-up care discussed   community resources reviewed   crisis management plan promoted   crisis management plan verbalized   follow-up care coordinated  Anticipated Discharge Disposition: residential substance use unit  Taken 7/11/2023 1603  Discharge Coordination/Progress: Patient is Medicaid pending. Therapist met with patient to complete assessment. Patient is agreeable to [facility] residential rehab when stable.  Transportation Anticipated:   family or friend will provide   agency  Transportation Concerns: none  Current Discharge Risk: substance use/abuse  Concerns to be Addressed:   substance/tobacco abuse/use   cognitive/perceptual   mental health   decision-making   coping/stress  Readmission Within the Last 30 Days: no previous admission in last 30 days  Patient/Family Anticipated Services at Transition:   rehabilitation services   mental health services  Patient's Choice of Community Agency(s): brother Carlos has also been working with a facility in [place] and will call back with the name  Patient/Family Anticipates Transition to: inpatient rehabilitation facility  Offered/Gave Vendor List: yes      DATA:         Therapist met individually with patient this date to introduce role and to discuss hospitalization expectations. Patient agreeable.     Patient signed consent for [facility], Mook Beltran, and brother Yahir.    Therapist spoke with patient's brother at this time and provided update. Brother is supportive and has been working with [facility] to secure bed for patient.      Clinical Maneuvering/Intervention:      Therapist assisted patient in processing above session content; acknowledged and normalized patient’s thoughts, feelings, and concerns.  Discussed the therapist/patient relationship and explain the parameters and limitations of relative confidentiality.  Also discussed the importance of active participation, and honesty to the treatment process.  Encouraged the patient to discuss/vent their feelings, frustrations, and fears concerning their ongoing medical issues and validated their feelings.     Discussed the importance of finding enjoyable activities and coping skills that the patient can engage in a regular basis. Discussed healthy coping skills such as distraction, self love, grounding, thought challenges/reframing, etc.  Provided patient with list of healthy coping skills this date. Discussed the importance of medication compliance.  Praised the patient for seeking help and spent the majority of the session building rapport.       Allowed patient to freely discuss issues without interruption or judgment. Provided safe, confidential environment to facilitate the development of positive therapeutic relationship and encourage open, honest communication.      Therapist addressed discharge safety planning this date. Assisted patient in identifying risk factors which would indicate the need for higher level of care after discharge;  including thoughts to harm self or others and/or self-harming behavior. Encouraged patient to call 911, or present to the nearest emergency room should any of these events occur. Discussed crisis intervention services and means to access.  Encouraged securing any objects of harm.       Therapist completed integrated summary, treatment plan, and initiated social history this date.  Therapist is strongly encouraging family involvement in treatment.       ASSESSMENT:      The patient is a 42 y/o  male admitted for alcohol detox treatment. Therapist met with patient on this date to  complete assessment, patient calm and cooperative. Patient reports high anxiety and moderate depression, denies current SI/HI/AVH. Patient reports experiencing shakes, cramps, and chills. No past [facility] admissions. Patient rents the basement of his brother's house and has joint custody of 11 y/o son. Patient discuss work and divorce with ongoing conflict as stressors. Patient began drinking at age 16, longest period of sobriety reported to be 5 days. Patient is agreeable to [facility] residential rehab when stable. Therapist discussed healthy coping skills and relapse prevention with patient.      PLAN:       Patient to remain hospitalized this date.     Treatment team will focus efforts on stabilizing patient's acute symptoms while providing education on healthy coping and crisis management to reduce hospitalizations.   Patient requires daily psychiatrist evaluation and 24/7 nursing supervision to promote patient  safety.     Therapist will offer 1-4 individual sessions, 1 therapy group daily, family education, and appropriate referral.    Therapist recommends [facility] residential rehab.

## 2023-07-11 NOTE — PLAN OF CARE
Problem: Adult Behavioral Health Plan of Care  Goal: Plan of Care Review  Outcome: Ongoing, Progressing  Flowsheets (Taken 7/11/2023 1727)  Progress: no change  Plan of Care Reviewed With: patient  Patient Agreement with Plan of Care: agrees  Outcome Evaluation: pt calm and cooperative.   Goal Outcome Evaluation:  Plan of Care Reviewed With: patient  Patient Agreement with Plan of Care: agrees     Progress: no change  Outcome Evaluation: pt calm and cooperative.

## 2023-07-11 NOTE — DISCHARGE PLACEMENT REQUEST
"Henrique Silva (43 y.o. Male)       Date of Birth   1980    Social Security Number       Address   [address] [address]    Home Phone   [phone]    MRN   [id number]       Sabianism   None    Marital Status                               Admission Date   7/10/23    Admission Type   Emergency    Admitting Provider   Art David MD    Attending Provider   Art David MD    Department, Room/Bed   [facility] ADULT CD, 1046/1S       Discharge Date       Discharge Disposition       Discharge Destination                                 Attending Provider: Art David MD    Allergies: No Known Allergies    Isolation: None   Infection: None   Code Status: CPR    Ht: 172.7 cm (68\")   Wt: 65.3 kg (144 lb)    Admission Cmt: None   Principal Problem: Alcohol use disorder, severe, dependence [F10.20]                   Active Insurance as of 7/10/2023       Primary Coverage       Payor Plan Insurance Group Employer/Plan Group    MEDICAID PENDING [place] MEDICA[place]END[place]        Payor Plan Address Payor Plan Phone Number Payor Plan Fax Number Effective Dates       2023 - None Entered      Subscriber Name Subscriber Birth Date Member ID       HENRIQUE SILVA 1980 PENDING                     Emergency Contacts        (Rel.) Home Phone Work Phone Mobile Phone    JACEK SILVA (Brother) [phone] -- --                 History & Physical        Art David MD at 07/10/23 1102                INITIAL PSYCHIATRIC HISTORY & PHYSICAL    Patient Identification:  Name:  Henrique Silva  Age:  43 y.o.  Sex:  male  :  1980  MRN:  [id number]   Visit Number:  [id number]  Primary Care Physician:  Provider, No Known    SUBJECTIVE    CC/Focus of Exam: alcohol and opioid use    HPI: Henrique Silva is a 43 y.o. male who was admitted on 7/10/2023 with complaints of alcohol and opioid use and withdrawals. The patient reports a long history of substance use. First " use was at age 16 when he started using marijuana, and would drink occasionally at that time and started taking pain pills in early 20s. Over time the use increased and the patient  continued to use despite negative consequences. The patient endorses symptoms of tolerance and withdrawals. Has tried to cut down and stop but has not been successful. Spends too much time and resources in pursuit of substance use. Longest period of sobriety is reported to be 5 days..  Currently using a fifth of [place] Bean daily, and about 2 mg of Suboxone daily orally.   Last use of alcohol was last night about 15 hours ago, and Suboxone 2 mg was about five days ago.   Withdrawal symptoms mild tremors, chills.     PAST PSYCHIATRIC HX: None reported.     SUBSTANCE USE HX: See HPI.     SOCIAL HX:   Social History     Socioeconomic History    Marital status:    Tobacco Use    Smoking status: Never    Smokeless tobacco: Current     Types: Snuff   Vaping Use    Vaping Use: Never used   Substance and Sexual Activity    Alcohol use: Yes     Comment: 5th or more daily x 4-5 yrs. Last drink 7/9/23    Drug use: Yes     Comment: Suboxone    Sexual activity: Defer         Past Medical History:   Diagnosis Date    Alcohol abuse     Withdrawal symptoms, alcohol           Past Surgical History:   Procedure Laterality Date    FOREIGN BODY REMOVAL      exploratory surgery in neck to remove glass 2002       Family History   Problem Relation Age of Onset    Alcohol abuse Father     Seizures Sister     Drug abuse Other          No medications prior to admission.         ALLERGIES:  Patient has no known allergies.    Temp:  [97.9 °F (36.6 °C)-98.3 °F (36.8 °C)] 98.3 °F (36.8 °C)  Heart Rate:  [73-96] 95  Resp:  [16-18] 18  BP: ()/(57-99) 138/94    REVIEW OF SYSTEMS:  Review of Systems   Constitutional:  Positive for chills and diaphoresis.   HENT: Negative.     Eyes: Negative.    Respiratory: Negative.     Cardiovascular: Negative.     Gastrointestinal: Negative.    Endocrine: Negative.    Genitourinary: Negative.    Musculoskeletal: Negative.    Skin: Negative.    Neurological:  Positive for tremors.   Hematological: Negative.    Psychiatric/Behavioral: Negative.  Positive for dysphoric mood. The patient is nervous/anxious.       OBJECTIVE    PHYSICAL EXAM:  Physical Exam  Constitutional:  Appears well-developed and well-nourished.   HENT:   Head: Normocephalic and atraumatic.   Right Ear: External ear normal.   Left Ear: External ear normal.   Mouth/Throat: Oropharynx is clear and moist.   Eyes: Pupils are equal, round, and reactive to light. Conjunctivae and EOM are normal.   Neck: Normal range of motion. Neck supple.   Cardiovascular: Normal rate, regular rhythm and normal heart sounds.    Respiratory: Effort normal and breath sounds normal. No respiratory distress. No wheezes.   GI: Soft. Bowel sounds are normal.No distension. There is no tenderness.   Musculoskeletal: Normal range of motion. No edema or deformity.   Neurological:No cranial nerve deficit. Coordination normal.   Skin: Skin is warm and dry. No rash noted. No erythema.     MENTAL STATUS EXAM:   Hygiene:   fair  Cooperation:  Cooperative  Eye Contact:  Fair  Psychomotor Behavior:  Appropriate  Affect:  Appropriate  Hopelessness: Denies  Speech:  Normal  Thought Process: Goal directed  Thought Content:  Normal  Suicidal:  None  Homicidal:  None  Hallucinations:  None  Delusion:  None  Memory:  Intact  Orientation:  Person, Place, Time and Situation  Reliability:  fair  Insight:  Fair  Judgement:  Fair  Impulse Control:  Fair      Imaging Results (Last 24 Hours)       ** No results found for the last 24 hours. **             ECG/EMG Results (most recent)       Procedure Component Value Units Date/Time    ECG 12 Lead Other [[id number]] Collected: 07/10/23 1044     Updated: 07/10/23 1045     QT Interval 362 ms      QTC Interval 445 ms     Narrative:      Test Reason : Other~  Blood  Pressure :   */*   mmHG  Vent. Rate :  91 BPM     Atrial Rate :  91 BPM     P-R Int : 148 ms          QRS Dur :  96 ms      QT Int : 362 ms       P-R-T Axes :  49  48  57 degrees     QTc Int : 445 ms    Normal sinus rhythm  Normal ECG  No previous ECGs available    Referred By:            Confirmed By:              Lab Results   Component Value Date    GLUCOSE 85 07/09/2023    BUN 10 07/09/2023    CREATININE 0.64 (L) 07/09/2023    BCR 15.6 07/09/2023    CO2 24.5 07/09/2023    CALCIUM 9.4 07/09/2023    ALBUMIN 4.5 07/09/2023     (H) 07/09/2023    ALT 26 07/09/2023       Lab Results   Component Value Date    WBC 8.49 07/09/2023    HGB 15.4 07/09/2023    HCT 46.4 07/09/2023    .1 (H) 07/09/2023     07/09/2023       Last Urine Toxicity          Latest Ref Rng & Units 7/9/2023   LAST URINE TOXICITY RESULTS   Amphetamine, Urine Qual Negative Negative    Barbiturates Screen, Urine Negative Negative    Benzodiazepine Screen, Urine Negative Negative    Buprenorphine, Screen, Urine Negative Negative    Cocaine Screen, Urine Negative Negative    Fentanyl, Urine Negative Negative    Methadone Screen , Urine Negative Negative    Methamphetamine, Ur Negative Negative        Brief Urine Lab Results  (Last result in the past 365 days)        Color   Clarity   Blood   Leuk Est   Nitrite   Protein   CREAT   Urine HCG        07/09/23 2014 Yellow   Clear   Negative   Negative   Negative   Negative                   DATA  Labs reviewed. , .1, MCH 33.6. Blood alcohol level 326 mg/dL at the time of presentation. UDS negative.   EKG reviewed. QTc interval 445 ms.   LEONEL reviewed.   Record reviewed. No previous treatment noted in this hospital for mental health or substance use problems.       Strengths: Motivated for treatment    Weaknesses:Substance use and Poor coping skills    Code status:  Full  Discussed code status with patient.    ASSESSMENT & PLAN:        Alcohol use disorder, severe,  dependence  -The patient has been drinking a fifth of liquor daily and last use was about 15 hours ago. He has started to exhibit some signs of withdrawals and given his heavy use he is likely to develop severe withdrawals. Will change status to inpatient and start Ativan detox.      Opioid use disorder, severe, dependence  -Patient has not used any in about five day and may not need a detox. Continue to monitor.       The patient has been admitted for safety and stabilization.  Patient will be monitored for suicidality daily and maintained on Special Precautions Level 4 (q30 min checks).  The patient will have individual and group therapy with a master's level therapist. A master treatment plan will be developed and agreed upon by the patient and his/her treatment team.  The patient's estimated length of stay in the hospital is 5-7 days.             Electronically signed by Art David MD at 07/10/23 1121          Physician Progress Notes (last 24 hours)        Art David MD at 23 1202          INPATIENT PSYCHIATRIC PROGRESS NOTE    Name:  Yadiel Silva  :  1980  MRN:  [id number]  Visit Number:  [id number]  Length of stay:  1    SUBJECTIVE    CC/Focus of Exam: alcohol use    INTERVAL HISTORY:  The patient states he is feeling some better today. States he felt his bed was shaking last night but then he realized it was his own body shaking.   Depression rating 5/10  Anxiety rating 7/10  Sleep:not good  Withdrawal sx: shakes, cramps, chills  Cravin/10    Review of Systems   Constitutional:  Positive for chills and fatigue.   Respiratory: Negative.     Cardiovascular: Negative.    Neurological:  Positive for tremors.   Psychiatric/Behavioral:  The patient is nervous/anxious.      OBJECTIVE    Temp:  [96.4 °F (35.8 °C)-97.8 °F (36.6 °C)] 96.4 °F (35.8 °C)  Heart Rate:  [] 77  Resp:  [16-20] 18  BP: ()/(62-88) 133/88    MENTAL STATUS EXAM:  Appearance:Casually dressed, good hygeine.  "  Cooperation:Cooperative  Psychomotor: No psychomotor agitation/retardation, No EPS, No motor tics  Speech-normal rate, amount.  Mood \"anxious\"   Affect-congruent, appropriate, stable  Thought Content-goal directed, no delusional material present  Thought process-linear, organized.  Suicidality: No SI  Homicidality: No HI  Perception: No AH/VH  Insight-fair   Judgement-fair    Lab Results (last 24 hours)       ** No results found for the last 24 hours. **               Imaging Results (Last 24 Hours)       ** No results found for the last 24 hours. **               ECG/EMG Results (most recent)       Procedure Component Value Units Date/Time    ECG 12 Lead Other [[id number]] Collected: 07/10/23 1044     Updated: 07/10/23 2017     QT Interval 362 ms      QTC Interval 445 ms     Narrative:      Test Reason : Other~  Blood Pressure :   */*   mmHG  Vent. Rate :  91 BPM     Atrial Rate :  91 BPM     P-R Int : 148 ms          QRS Dur :  96 ms      QT Int : 362 ms       P-R-T Axes :  49  48  57 degrees     QTc Int : 445 ms    Normal sinus rhythm  Normal ECG  No previous ECGs available  Confirmed by Collin Ng ([id number]) on 7/10/2023 8:17:41 PM    Referred By:            Confirmed By: Collin Ng             ALLERGIES: Patient has no known allergies.    Medication Review:   Scheduled Medications:  B-complex with vitamin C, 2 tablet, Oral, Daily  LORazepam, 2 mg, Oral, 3 times per day   Followed by  [START ON 7/12/2023] LORazepam, 1.5 mg, Oral, 3 times per day   Followed by  [START ON 7/13/2023] LORazepam, 1 mg, Oral, 3 times per day   Followed by  [START ON 7/14/2023] LORazepam, 0.5 mg, Oral, 3 times per day  multivitamin with minerals, 1 tablet, Oral, Daily  thiamine, 100 mg, Oral, Daily         PRN Medications:    acetaminophen    aluminum-magnesium hydroxide-simethicone    benzonatate    benztropine **OR** benztropine    famotidine    hydrOXYzine    ibuprofen    loperamide    LORazepam **FOLLOWED BY** [START " ON 7/12/2023] LORazepam **FOLLOWED BY** [START ON 7/13/2023] LORazepam **FOLLOWED BY** [START ON 7/14/2023] LORazepam    magnesium hydroxide    ondansetron    sodium chloride    traZODone   All medications reviewed.    ASSESSMENT & PLAN:     Alcohol use disorder, severe, dependence  -Continue Ativan detox.  -Thiamine and folate       Opioid use disorder, severe, dependence  -Patient has not used any in about five days prior to coming to the hospital and may not need a detox. Continue to monitor.     Special precautions: Special Precautions Level 4 (q30 min checks).    Behavioral Health Treatment Plan and Problem List: I have reviewed and approved the Behavioral Health Treatment Plan and Problem list.  The patient has had a chance to review and agrees with the treatment plan.    Copied text in portions of this note has been reviewed and is accurate as of 07/11/23         Clinician:  Art David MD  07/11/23  12:02 EDT    Electronically signed by Art David MD at 07/11/23 3991

## 2023-07-12 PROCEDURE — 99232 SBSQ HOSP IP/OBS MODERATE 35: CPT | Performed by: PSYCHIATRY & NEUROLOGY

## 2023-07-12 RX ADMIN — HYDROXYZINE HYDROCHLORIDE 50 MG: 50 TABLET ORAL at 08:07

## 2023-07-12 RX ADMIN — Medication 100 MG: at 08:07

## 2023-07-12 RX ADMIN — LORAZEPAM 1.5 MG: 1 TABLET ORAL at 21:03

## 2023-07-12 RX ADMIN — TRAZODONE HYDROCHLORIDE 50 MG: 50 TABLET ORAL at 21:03

## 2023-07-12 RX ADMIN — Medication 1 TABLET: at 08:06

## 2023-07-12 RX ADMIN — LOPERAMIDE HYDROCHLORIDE 2 MG: 2 CAPSULE ORAL at 21:03

## 2023-07-12 RX ADMIN — LORAZEPAM 1.5 MG: 1 TABLET ORAL at 14:11

## 2023-07-12 RX ADMIN — LORAZEPAM 1.5 MG: 1 TABLET ORAL at 08:07

## 2023-07-12 RX ADMIN — Medication 2 TABLET: at 08:06

## 2023-07-12 RX ADMIN — HYDROXYZINE HYDROCHLORIDE 50 MG: 50 TABLET ORAL at 21:03

## 2023-07-12 NOTE — PLAN OF CARE
Goal Outcome Evaluation:        Problem: Adult Behavioral Health Plan of Care  Goal: Patient-Specific Goal (Individualization)  Outcome: Ongoing, Progressing  Flowsheets  Taken 7/11/2023 1605  Patient-Specific Goals (Include Timeframe): Identify 2-3 coping skills, address relapse prevention methods, complete aftercare plans, and deny SI/HI prior to discharge.  Individualized Care Needs: Therapist to offer 1-4 therapy sessions, aftercare planning, safety planning, family education, group therapy, and brief CBT/MI interventions.  Anxieties, Fears or Concerns: none verbalized  Taken 7/11/2023 0940  Patient Personal Strengths:   resilient   resourceful   motivated for recovery   motivated for treatment   independent living skills   positive vocational history   expressive of emotions   expressive of needs   parenting skills   stable living environment   socioeconomic stability   self-reliant  Patient Vulnerabilities:   substance abuse/addiction   history of unsuccessful treatment   poor impulse control  Goal: Optimized Coping Skills in Response to Life Stressors  Outcome: Ongoing, Progressing  Flowsheets (Taken 7/11/2023 1605)  Optimized Coping Skills in Response to Life Stressors: making progress toward outcome  Intervention: Promote Effective Coping Strategies  Flowsheets (Taken 7/12/2023 0956)  Supportive Measures:   active listening utilized   counseling provided   decision-making supported   goal-setting facilitated   verbalization of feelings encouraged   self-responsibility promoted   positive reinforcement provided   self-reflection promoted   self-care encouraged  Goal: Develops/Participates in Therapeutic [place] to Support Successful Transition  Outcome: Ongoing, Progressing  Flowsheets (Taken 7/11/2023 1605)  Develops/Participates in Therapeutic [place] to Support Successful Transition: making progress toward outcome  Intervention: Foster Therapeutic [place]  Flowsheets (Taken 7/12/2023 0956)  Trust  Relationship/Rapport:   care explained   reassurance provided   choices provided   thoughts/feelings acknowledged   emotional support provided   questions answered   empathic listening provided   questions encouraged  Intervention: Mutually Develop Transition Plan  Flowsheets  Taken 7/12/2023 0966  Discharge Coordination/Progress: Patient is Medicaid pending. Patient has referrals pending with [facility] and [facility].  Transportation Anticipated: agency  Transportation Concerns: none  Current Discharge Risk: substance use/abuse  Concerns to be Addressed:   substance/tobacco abuse/use   cognitive/perceptual   mental health   decision-making   coping/stress  Readmission Within the Last 30 Days: no previous admission in last 30 days  Patient/Family Anticipated Services at Transition:   rehabilitation services   mental health services  Patient's Choice of Community Agency(s): Referrals pending with [facility] and [facility]  Patient/Family Anticipates Transition to: inpatient rehabilitation facility  Offered/Gave Vendor List: yes  Taken 7/11/2023 1605  Outpatient/Agency/Support Group Needs: residential services  Transition Support:   follow-up care discussed   community resources reviewed   crisis management plan promoted   crisis management plan verbalized   follow-up care coordinated  Anticipated Discharge Disposition: residential substance use unit         DATA:  Therapist met with Patient individually this date. Patient agreeable to discuss current treatment progress and discharge concerns.     [facility] bed available on Friday.     CLINICAL MANUVERING/INTERVENTIONS:  Assisted Patient in processing session content; acknowledged and normalized Patient’s thoughts, feelings, and concerns by utilizing a person-centered approach in efforts to build appropriate rapport and a positive therapeutic relationship with open and honest communication. Allowed Patient to ventilate regarding current stressors and  triggers for negative emotions and thoughts in a safe nonjudgmental environment with unconditional positive regard, active listening skills, and empathy. Therapist implemented motivational interviewing techniques to assist Patient with exploring personal growth and change and discussed distress tolerance skills, self soothing techniques, and applied cognitive behavioral strategies to facilitate identification of maladaptive patterns of thinking and behavior.Therapist utilized dialectical behavior techniques to teach and model emotional regulation and relaxation methods. Therapist assisted Patient with identifying and implementing healthier coping strategies. Therapist assisted Patient with safety planning; Patient agreed to continue honest communication with Treatment Team while inpatient and identify any SI/HI.  Patient encouraged to seek nearest ER or contact 911 if danger to self or others post discharge.     ASSESSMENT:    Therapist met with patient on this date, patient continues to receive treatment for alcohol detox. Patient reports moderate anxiety and depression, denies current SI/HI/AVH. Patient reports experiencing chills, sweats, tremors, and cramps. Patient has been accepted by [facility] and will have a bed on Friday. Patient has decided to admit to [facility] at discharge over [facility].     PLAN:   Patient will continue stabilization. Patient will continue to receive services offered by Treatment Team.     Therapist recommends [facility] residential rehab. Bed available at [facility] on Friday.

## 2023-07-12 NOTE — PROGRESS NOTES
Navigator is helping with the following referral:     [facility] - [phone]  -Sent 7/11  -Transferred call so patient could complete phone screening.  7/12  -Patient approved. Bed available on Friday. Discharge summary will need to be faxed to [phone].  7/12     [facility] - [phone]  -Attempted to reach staff to screen patient. No answer. 7/11  -Spoke with [facility]. Patient to call today at 1:30pm to complete phone screening. 7/12

## 2023-07-12 NOTE — PLAN OF CARE
Goal Outcome Evaluation:  Plan of Care Reviewed With: patient  Patient Agreement with Plan of Care: agrees     Progress: improving  Outcome Evaluation: Patient rates anxiety and depression as 6/10 and cravings as 7/10. Pt denies SI/HI/AVH. Pt reports good sleep and appetite. Continuing to mx pt.

## 2023-07-12 NOTE — PLAN OF CARE
Goal Outcome Evaluation:  Plan of Care Reviewed With: patient  Patient Agreement with Plan of Care: agrees     Progress: improving  Outcome Evaluation: Patient cooperative during assessment. Did not rate craving level. Anx 6, dep 7. Denied SI/HI/AVH.

## 2023-07-12 NOTE — PROGRESS NOTES
"INPATIENT PSYCHIATRIC PROGRESS NOTE    Name:  Yadiel Silva  :  1980  MRN:  [id number]  Visit Number:  [id number]  Length of stay:  2    SUBJECTIVE    CC/Focus of Exam: alcohol use    INTERVAL HISTORY:  The patient states he is experiencing some chills and sweats and is continuing to have tremors. Was able to sleep good last night. Reports no hallucinations or confusion.  Depression rating 5/10  Anxiety rating 5/10  Sleep:better  Withdrawal sx: shakes, cramps, chills, sweats  Cravin/10    Review of Systems   Constitutional:  Positive for chills, diaphoresis and fatigue.   Respiratory: Negative.     Cardiovascular: Negative.    Neurological:  Positive for tremors.   Psychiatric/Behavioral:  The patient is nervous/anxious.      OBJECTIVE    Temp:  [96.5 °F (35.8 °C)-97.7 °F (36.5 °C)] 96.5 °F (35.8 °C)  Heart Rate:  [70-95] 76  Resp:  [16-18] 18  BP: (108-131)/(75-95) 131/95    MENTAL STATUS EXAM:  Appearance:Casually dressed, good hygeine.   Cooperation:Cooperative  Psychomotor: No psychomotor agitation/retardation, No EPS, No motor tics  Speech-normal rate, amount.  Mood \"anxious\"   Affect-congruent, appropriate, stable  Thought Content-goal directed, no delusional material present  Thought process-linear, organized.  Suicidality: No SI  Homicidality: No HI  Perception: No AH/VH  Insight-fair   Judgement-fair    Lab Results (last 24 hours)       ** No results found for the last 24 hours. **               Imaging Results (Last 24 Hours)       ** No results found for the last 24 hours. **               ECG/EMG Results (most recent)       Procedure Component Value Units Date/Time    ECG 12 Lead Other [[id number]] Collected: 07/10/23 1044     Updated: 07/10/23 2017     QT Interval 362 ms      QTC Interval 445 ms     Narrative:      Test Reason : Other~  Blood Pressure :   */*   mmHG  Vent. Rate :  91 BPM     Atrial Rate :  91 BPM     P-R Int : 148 ms          QRS Dur :  96 ms      QT Int : 362 ms       P-R-T " Axes :  49  48  57 degrees     QTc Int : 445 ms    Normal sinus rhythm  Normal ECG  No previous ECGs available  Confirmed by Collin Ng ([id number]) on 7/10/2023 8:17:41 PM    Referred By:            Confirmed By: Collin Ng             ALLERGIES: Patient has no known allergies.    Medication Review:   Scheduled Medications:  B-complex with vitamin C, 2 tablet, Oral, Daily  LORazepam, 1.5 mg, Oral, 3 times per day   Followed by  [START ON 2023] LORazepam, 1 mg, Oral, 3 times per day   Followed by  [START ON 2023] LORazepam, 0.5 mg, Oral, 3 times per day  multivitamin with minerals, 1 tablet, Oral, Daily  thiamine, 100 mg, Oral, Daily         PRN Medications:    acetaminophen    aluminum-magnesium hydroxide-simethicone    benzonatate    benztropine **OR** benztropine    famotidine    hydrOXYzine    ibuprofen    loperamide    [] LORazepam **FOLLOWED BY** LORazepam **FOLLOWED BY** [START ON 2023] LORazepam **FOLLOWED BY** [START ON 2023] LORazepam    magnesium hydroxide    ondansetron    sodium chloride    traZODone   All medications reviewed.    ASSESSMENT & PLAN:     Alcohol use disorder, severe, dependence  -Continue Ativan detox.  -Thiamine and folate       Opioid use disorder, severe, dependence  -Patient has not used any in about five days prior to coming to the hospital and may not need a detox. Continue to monitor.     Special precautions: Special Precautions Level 4 (q30 min checks).    Behavioral Health Treatment Plan and Problem List: I have reviewed and approved the Behavioral Health Treatment Plan and Problem list.  The patient has had a chance to review and agrees with the treatment plan.    Copied text in portions of this note has been reviewed and is accurate as of 23         Clinician:  Art David MD  23  11:01 EDT

## 2023-07-13 PROCEDURE — 63710000001 ONDANSETRON PER 8 MG: Performed by: PSYCHIATRY & NEUROLOGY

## 2023-07-13 PROCEDURE — 99232 SBSQ HOSP IP/OBS MODERATE 35: CPT | Performed by: PSYCHIATRY & NEUROLOGY

## 2023-07-13 RX ORDER — CLONIDINE HYDROCHLORIDE 0.1 MG/1
0.1 TABLET ORAL 2 TIMES DAILY PRN
Status: DISCONTINUED | OUTPATIENT
Start: 2023-07-16 | End: 2023-07-15 | Stop reason: HOSPADM

## 2023-07-13 RX ORDER — DICYCLOMINE HYDROCHLORIDE 10 MG/1
10 CAPSULE ORAL 3 TIMES DAILY PRN
Status: DISCONTINUED | OUTPATIENT
Start: 2023-07-13 | End: 2023-07-15 | Stop reason: HOSPADM

## 2023-07-13 RX ORDER — CLONIDINE HYDROCHLORIDE 0.1 MG/1
0.1 TABLET ORAL 3 TIMES DAILY PRN
Status: DISCONTINUED | OUTPATIENT
Start: 2023-07-15 | End: 2023-07-15 | Stop reason: HOSPADM

## 2023-07-13 RX ORDER — CLONIDINE HYDROCHLORIDE 0.1 MG/1
0.1 TABLET ORAL 4 TIMES DAILY PRN
Status: ACTIVE | OUTPATIENT
Start: 2023-07-14 | End: 2023-07-15

## 2023-07-13 RX ORDER — CLONIDINE HYDROCHLORIDE 0.1 MG/1
0.1 TABLET ORAL ONCE AS NEEDED
Status: DISCONTINUED | OUTPATIENT
Start: 2023-07-17 | End: 2023-07-15 | Stop reason: HOSPADM

## 2023-07-13 RX ORDER — CYCLOBENZAPRINE HCL 10 MG
10 TABLET ORAL 3 TIMES DAILY PRN
Status: DISCONTINUED | OUTPATIENT
Start: 2023-07-13 | End: 2023-07-15 | Stop reason: HOSPADM

## 2023-07-13 RX ORDER — CLONIDINE HYDROCHLORIDE 0.1 MG/1
0.1 TABLET ORAL 4 TIMES DAILY PRN
Status: ACTIVE | OUTPATIENT
Start: 2023-07-13 | End: 2023-07-14

## 2023-07-13 RX ORDER — HYDRALAZINE HYDROCHLORIDE 25 MG/1
25 TABLET, FILM COATED ORAL DAILY PRN
Status: DISCONTINUED | OUTPATIENT
Start: 2023-07-13 | End: 2023-07-15 | Stop reason: HOSPADM

## 2023-07-13 RX ADMIN — CYCLOBENZAPRINE 10 MG: 10 TABLET, FILM COATED ORAL at 21:13

## 2023-07-13 RX ADMIN — Medication 100 MG: at 08:29

## 2023-07-13 RX ADMIN — ONDANSETRON HYDROCHLORIDE 4 MG: 4 TABLET, FILM COATED ORAL at 21:13

## 2023-07-13 RX ADMIN — Medication 1 TABLET: at 08:30

## 2023-07-13 RX ADMIN — LORAZEPAM 1 MG: 1 TABLET ORAL at 08:29

## 2023-07-13 RX ADMIN — LORAZEPAM 1 MG: 1 TABLET ORAL at 21:13

## 2023-07-13 RX ADMIN — LOPERAMIDE HYDROCHLORIDE 2 MG: 2 CAPSULE ORAL at 08:30

## 2023-07-13 RX ADMIN — LOPERAMIDE HYDROCHLORIDE 2 MG: 2 CAPSULE ORAL at 21:13

## 2023-07-13 RX ADMIN — LORAZEPAM 1 MG: 1 TABLET ORAL at 14:18

## 2023-07-13 RX ADMIN — Medication 2 TABLET: at 08:29

## 2023-07-13 RX ADMIN — TRAZODONE HYDROCHLORIDE 50 MG: 50 TABLET ORAL at 21:13

## 2023-07-13 NOTE — PLAN OF CARE
Goal Outcome Evaluation:  Plan of Care Reviewed With: patient  Patient Agreement with Plan of Care: agrees     Progress: improving     Pt slept well, appetite is good, and participated in group. Pt given Atarax, Imodium, and Trazodone prn medications.

## 2023-07-13 NOTE — PLAN OF CARE
Goal Outcome Evaluation:  Plan of Care Reviewed With: patient  Patient Agreement with Plan of Care: agrees     Progress: improving  Outcome Evaluation: Patient calm and cooperative. Rates anxiety 8/10. Depression 5/10. Cravings 9/10. Withdrawal symptoms: joan, tremors, cold feet, and feeling exhausted. Denies SI, HI, or [place]. No other issues noted at this time. Will continue to monitor.

## 2023-07-13 NOTE — PROGRESS NOTES
"INPATIENT PSYCHIATRIC PROGRESS NOTE    Name:  Yadiel Silva  :  1980  MRN:  [id number]  Visit Number:  [id number]  Length of stay:  3    SUBJECTIVE    CC/Focus of Exam: alcohol use    INTERVAL HISTORY:  The patient states he is developing more withdrawals and last night was the first night he had the sweats and diarrhea.  Depression rating 5/10  Anxiety rating 5/10  Sleep:better  Withdrawal sx: shakes, cramps, chills, sweats, diarrhea  Cravin/10    Review of Systems   Constitutional:  Positive for chills, diaphoresis and fatigue.   Respiratory: Negative.     Cardiovascular: Negative.    Gastrointestinal:  Positive for diarrhea.   Neurological:  Positive for tremors.   Psychiatric/Behavioral:  The patient is nervous/anxious.      OBJECTIVE    Temp:  [96.8 °F (36 °C)-97.5 °F (36.4 °C)] 96.8 °F (36 °C)  Heart Rate:  [65-97] 81  Resp:  [16-18] 18  BP: (100-126)/(71-85) 122/84    MENTAL STATUS EXAM:  Appearance:Casually dressed, good hygeine.   Cooperation:Cooperative  Psychomotor: No psychomotor agitation/retardation, No EPS, No motor tics  Speech-normal rate, amount.  Mood \"anxious\"   Affect-congruent, appropriate, stable  Thought Content-goal directed, no delusional material present  Thought process-linear, organized.  Suicidality: No SI  Homicidality: No HI  Perception: No AH/VH  Insight-fair   Judgement-fair    Lab Results (last 24 hours)       ** No results found for the last 24 hours. **               Imaging Results (Last 24 Hours)       ** No results found for the last 24 hours. **               ECG/EMG Results (most recent)       Procedure Component Value Units Date/Time    ECG 12 Lead Other [[id number]] Collected: 07/10/23 1044     Updated: 07/10/23 2017     QT Interval 362 ms      QTC Interval 445 ms     Narrative:      Test Reason : Other~  Blood Pressure :   */*   mmHG  Vent. Rate :  91 BPM     Atrial Rate :  91 BPM     P-R Int : 148 ms          QRS Dur :  96 ms      QT Int : 362 ms       P-R-T " Axes :  49  48  57 degrees     QTc Int : 445 ms    Normal sinus rhythm  Normal ECG  No previous ECGs available  Confirmed by Collin Ng ([id number]) on 7/10/2023 8:17:41 PM    Referred By:            Confirmed By: Collin Ng             ALLERGIES: Patient has no known allergies.    Medication Review:   Scheduled Medications:  B-complex with vitamin C, 2 tablet, Oral, Daily  LORazepam, 1 mg, Oral, 3 times per day   Followed by  [START ON 2023] LORazepam, 0.5 mg, Oral, 3 times per day  multivitamin with minerals, 1 tablet, Oral, Daily  thiamine, 100 mg, Oral, Daily         PRN Medications:    acetaminophen    aluminum-magnesium hydroxide-simethicone    benzonatate    benztropine **OR** benztropine    famotidine    hydrOXYzine    ibuprofen    loperamide    [] LORazepam **FOLLOWED BY** [] LORazepam **FOLLOWED BY** LORazepam **FOLLOWED BY** [START ON 2023] LORazepam    magnesium hydroxide    ondansetron    sodium chloride    traZODone   All medications reviewed.    ASSESSMENT & PLAN:     Alcohol use disorder, severe, dependence  -Continue Ativan detox.  -Thiamine and folate       Opioid use disorder, severe, dependence  -Patient reports he is starting to experience withdrawals from Suboxone.   -Start clonidine detox.     Special precautions: Special Precautions Level 4 (q30 min checks).    Behavioral Health Treatment Plan and Problem List: I have reviewed and approved the Behavioral Health Treatment Plan and Problem list.  The patient has had a chance to review and agrees with the treatment plan.    Copied text in portions of this note has been reviewed and is accurate as of 23         Clinician:  Art David MD  23  12:36 EDT

## 2023-07-13 NOTE — PLAN OF CARE
Goal Outcome Evaluation:        Problem: Adult Behavioral Health Plan of Care  Goal: Patient-Specific Goal (Individualization)  Outcome: Ongoing, Progressing  Flowsheets  Taken 7/11/2023 1605  Patient-Specific Goals (Include Timeframe): Identify 2-3 coping skills, address relapse prevention methods, complete aftercare plans, and deny SI/HI prior to discharge.  Individualized Care Needs: Therapist to offer 1-4 therapy sessions, aftercare planning, safety planning, family education, group therapy, and brief CBT/MI interventions.  Anxieties, Fears or Concerns: none verbalized  Taken 7/11/2023 0940  Patient Personal Strengths:   resilient   resourceful   motivated for recovery   motivated for treatment   independent living skills   positive vocational history   expressive of emotions   expressive of needs   parenting skills   stable living environment   socioeconomic stability   self-reliant  Patient Vulnerabilities:   substance abuse/addiction   history of unsuccessful treatment   poor impulse control  Goal: Optimized Coping Skills in Response to Life Stressors  Outcome: Ongoing, Progressing  Flowsheets (Taken 7/11/2023 1605)  Optimized Coping Skills in Response to Life Stressors: making progress toward outcome  Intervention: Promote Effective Coping Strategies  Flowsheets (Taken 7/13/2023 1047)  Supportive Measures:   counseling provided   active listening utilized   decision-making supported   goal-setting facilitated   self-responsibility promoted   verbalization of feelings encouraged   positive reinforcement provided   self-reflection promoted   self-care encouraged  Goal: Develops/Participates in Therapeutic [place] to Support Successful Transition  Outcome: Ongoing, Progressing  Flowsheets (Taken 7/11/2023 1605)  Develops/Participates in Therapeutic [place] to Support Successful Transition: making progress toward outcome  Intervention: Foster Therapeutic [place]  Flowsheets (Taken 7/13/2023 0845 by Nando  Luis Manuel Foster RN)  Trust Relationship/Rapport:   care explained   choices provided   emotional support provided   empathic listening provided   questions answered   questions encouraged   reassurance provided   thoughts/feelings acknowledged  Intervention: Mutually Develop Transition Plan  Flowsheets  Taken 7/13/2023 1046  Discharge Coordination/Progress: Patient is Medicaid pending. Patient to admit to [facility] when stable, agency will provide transportation.  Transportation Anticipated: agency  Transportation Concerns: none  Current Discharge Risk: substance use/abuse  Concerns to be Addressed:   substance/tobacco abuse/use   cognitive/perceptual   mental health   decision-making   coping/stress  Readmission Within the Last 30 Days: no previous admission in last 30 days  Patient/Family Anticipated Services at Transition:   rehabilitation services   mental health services  Patient's Choice of Community Agency(s): [facility]  Patient/Family Anticipates Transition to: inpatient rehabilitation facility  Offered/Gave Vendor List: yes  Taken 7/11/2023 1605  Outpatient/Agency/Support Group Needs: residential services  Transition Support:   follow-up care discussed   community resources reviewed   crisis management plan promoted   crisis management plan verbalized   follow-up care coordinated  Anticipated Discharge Disposition: residential substance use unit         DATA:  Therapist met with Patient individually this date. Patient agreeable to discuss current treatment progress and discharge concerns.     CLINICAL MANUVERING/INTERVENTIONS:  Assisted Patient in processing session content; acknowledged and normalized Patient’s thoughts, feelings, and concerns by utilizing a person-centered approach in efforts to build appropriate rapport and a positive therapeutic relationship with open and honest communication. Allowed Patient to ventilate regarding current stressors and triggers for negative emotions and thoughts in  a safe nonjudgmental environment with unconditional positive regard, active listening skills, and empathy. Therapist implemented motivational interviewing techniques to assist Patient with exploring personal growth and change and discussed distress tolerance skills, self soothing techniques, and applied cognitive behavioral strategies to facilitate identification of maladaptive patterns of thinking and behavior.Therapist utilized dialectical behavior techniques to teach and model emotional regulation and relaxation methods. Therapist assisted Patient with identifying and implementing healthier coping strategies. Therapist assisted Patient with safety planning; Patient agreed to continue honest communication with Treatment Team while inpatient and identify any SI/HI.  Patient encouraged to seek nearest ER or contact 911 if danger to self or others post discharge.     ASSESSMENT:    Patient continues to receive treatment for alcohol detox. Patient reports moderate anxiety and depression, denies current SI/HI/AVH. Patient reports worsening of withdrawal symptoms; experiencing shakes, cramps, chills, sweats, and diarrhea. Patient plans to go to [facility] at discharge. At this time bed is scheduled for tomorrow however, treatment team will reschedule if needed.     PLAN:   Patient will continue stabilization. Patient will continue to receive services offered by Treatment Team.     Admit to [facility] when stable.

## 2023-07-14 PROCEDURE — 99232 SBSQ HOSP IP/OBS MODERATE 35: CPT | Performed by: PSYCHIATRY & NEUROLOGY

## 2023-07-14 RX ADMIN — Medication 1 TABLET: at 08:27

## 2023-07-14 RX ADMIN — LOPERAMIDE HYDROCHLORIDE 2 MG: 2 CAPSULE ORAL at 14:17

## 2023-07-14 RX ADMIN — LORAZEPAM 0.5 MG: 0.5 TABLET ORAL at 08:27

## 2023-07-14 RX ADMIN — LORAZEPAM 0.5 MG: 0.5 TABLET ORAL at 14:17

## 2023-07-14 RX ADMIN — LORAZEPAM 0.5 MG: 0.5 TABLET ORAL at 21:13

## 2023-07-14 RX ADMIN — TRAZODONE HYDROCHLORIDE 50 MG: 50 TABLET ORAL at 21:14

## 2023-07-14 RX ADMIN — Medication 2 TABLET: at 08:27

## 2023-07-14 RX ADMIN — LOPERAMIDE HYDROCHLORIDE 2 MG: 2 CAPSULE ORAL at 19:07

## 2023-07-14 RX ADMIN — LOPERAMIDE HYDROCHLORIDE 2 MG: 2 CAPSULE ORAL at 08:27

## 2023-07-14 RX ADMIN — Medication 100 MG: at 08:27

## 2023-07-14 NOTE — PROGRESS NOTES
"INPATIENT PSYCHIATRIC PROGRESS NOTE    Name:  Yadiel Silva  :  1980  MRN:  [id number]  Visit Number:  [id number]  Length of stay:  4    SUBJECTIVE    CC/Focus of Exam: alcohol use    INTERVAL HISTORY:  The patient states he is still having a lot of withdrawals and is feeling tired and fatigued.  Depression rating 5/10  Anxiety rating 5/10  Sleep:better  Withdrawal sx: shakes, cramps, chills, sweats, diarrhea  Cravin/10    Review of Systems   Constitutional:  Positive for chills, diaphoresis and fatigue.   Respiratory: Negative.     Cardiovascular: Negative.    Gastrointestinal:  Positive for diarrhea.   Neurological:  Positive for tremors.   Psychiatric/Behavioral:  The patient is nervous/anxious.      OBJECTIVE    Temp:  [96.6 °F (35.9 °C)-98 °F (36.7 °C)] 96.6 °F (35.9 °C)  Heart Rate:  [74-87] 84  Resp:  [18] 18  BP: ()/(65-86) 107/76    MENTAL STATUS EXAM:  Appearance:Casually dressed, good hygeine.   Cooperation:Cooperative  Psychomotor: No psychomotor agitation/retardation, No EPS, No motor tics  Speech-normal rate, amount.  Mood \"anxious\"   Affect-congruent, appropriate, stable  Thought Content-goal directed, no delusional material present  Thought process-linear, organized.  Suicidality: No SI  Homicidality: No HI  Perception: No AH/VH  Insight-fair   Judgement-fair    Lab Results (last 24 hours)       ** No results found for the last 24 hours. **               Imaging Results (Last 24 Hours)       ** No results found for the last 24 hours. **               ECG/EMG Results (most recent)       Procedure Component Value Units Date/Time    ECG 12 Lead Other [[id number]] Collected: 07/10/23 1044     Updated: 07/10/23 2017     QT Interval 362 ms      QTC Interval 445 ms     Narrative:      Test Reason : Other~  Blood Pressure :   */*   mmHG  Vent. Rate :  91 BPM     Atrial Rate :  91 BPM     P-R Int : 148 ms          QRS Dur :  96 ms      QT Int : 362 ms       P-R-T Axes :  49  48  57 degrees   "   QTc Int : 445 ms    Normal sinus rhythm  Normal ECG  No previous ECGs available  Confirmed by Collin Ng ([id number]) on 7/10/2023 8:17:41 PM    Referred By:            Confirmed By: Collin Ng             ALLERGIES: Patient has no known allergies.    Medication Review:   Scheduled Medications:  B-complex with vitamin C, 2 tablet, Oral, Daily  LORazepam, 0.5 mg, Oral, 3 times per day  multivitamin with minerals, 1 tablet, Oral, Daily  thiamine, 100 mg, Oral, Daily         PRN Medications:    acetaminophen    aluminum-magnesium hydroxide-simethicone    benzonatate    benztropine **OR** benztropine    cloNIDine **FOLLOWED BY** [START ON 7/15/2023] cloNIDine **FOLLOWED BY** [START ON 2023] cloNIDine **FOLLOWED BY** [START ON 2023] cloNIDine    cyclobenzaprine    dicyclomine    famotidine    hydrALAZINE    hydrOXYzine    ibuprofen    loperamide    [] LORazepam **FOLLOWED BY** [] LORazepam **FOLLOWED BY** [] LORazepam **FOLLOWED BY** LORazepam    magnesium hydroxide    ondansetron    sodium chloride    traZODone   All medications reviewed.    ASSESSMENT & PLAN:     Alcohol use disorder, severe, dependence  -Continue Ativan detox.  -Thiamine and folate       Opioid use disorder, severe, dependence  -Patient reports he is starting to experience withdrawals from Suboxone.   -Continue clonidine detox.     Plan discharge tomorrow.    Special precautions: Special Precautions Level 4 (q30 min checks).    Behavioral Health Treatment Plan and Problem List: I have reviewed and approved the Behavioral Health Treatment Plan and Problem list.  The patient has had a chance to review and agrees with the treatment plan.    Copied text in portions of this note has been reviewed and is accurate as of 23         Clinician:  Art David MD  23  10:57 EDT

## 2023-07-14 NOTE — PLAN OF CARE
Goal Outcome Evaluation:  Plan of Care Reviewed With: patient  Patient Agreement with Plan of Care: agrees     Progress: improving  Outcome Evaluation: Patient calm and cooperative. Rates anxiety 7/10. Depression 5/10. Cravings 7/10. Withdrawal symptoms: sweats, sneezing, runny nose, tremors, and diarrhea. Denies SI, HI, or [place]. No other issues noted at this time. Will continue to monitor.

## 2023-07-14 NOTE — PLAN OF CARE
Goal Outcome Evaluation:  Plan of Care Reviewed With: patient  Patient Agreement with Plan of Care: agrees     Progress: improving     Pt slept well, appetite is good, and participated in group. Pt given Flexeril, Imodium, Zofran, and Trazodone prn medications.

## 2023-07-14 NOTE — PLAN OF CARE
Goal Outcome Evaluation:        Problem: Adult Behavioral Health Plan of Care  Goal: Patient-Specific Goal (Individualization)  Outcome: Ongoing, Progressing  Flowsheets  Taken 7/11/2023 1605  Patient-Specific Goals (Include Timeframe): Identify 2-3 coping skills, address relapse prevention methods, complete aftercare plans, and deny SI/HI prior to discharge.  Individualized Care Needs: Therapist to offer 1-4 therapy sessions, aftercare planning, safety planning, family education, group therapy, and brief CBT/MI interventions.  Anxieties, Fears or Concerns: none verbalized  Taken 7/11/2023 0940  Patient Personal Strengths:   resilient   resourceful   motivated for recovery   motivated for treatment   independent living skills   positive vocational history   expressive of emotions   expressive of needs   parenting skills   stable living environment   socioeconomic stability   self-reliant  Patient Vulnerabilities:   substance abuse/addiction   history of unsuccessful treatment   poor impulse control  Goal: Optimized Coping Skills in Response to Life Stressors  Outcome: Ongoing, Progressing  Flowsheets (Taken 7/11/2023 1605)  Optimized Coping Skills in Response to Life Stressors: making progress toward outcome  Intervention: Promote Effective Coping Strategies  Flowsheets (Taken 7/14/2023 1125)  Supportive Measures:   active listening utilized   counseling provided   decision-making supported   goal-setting facilitated   verbalization of feelings encouraged   self-responsibility promoted   self-reflection promoted   self-care encouraged   positive reinforcement provided  Goal: Develops/Participates in Therapeutic [place] to Support Successful Transition  Outcome: Ongoing, Progressing  Flowsheets (Taken 7/11/2023 1605)  Develops/Participates in Therapeutic [place] to Support Successful Transition: making progress toward outcome  Intervention: Foster Therapeutic [place]  Flowsheets (Taken 7/14/2023 1020 by Nando  Luis Manuel Foster RN)  Trust Relationship/Rapport:   care explained   choices provided   emotional support provided   empathic listening provided   questions answered   questions encouraged   reassurance provided   thoughts/feelings acknowledged  Intervention: Mutually Develop Transition Plan  Flowsheets  Taken 7/14/2023 1124  Discharge Coordination/Progress: Patient is Medicaid pending. Patient to admit to [facility] when stable, agency will provide transportation.  Transportation Anticipated: agency  Transportation Concerns: none  Current Discharge Risk: substance use/abuse  Concerns to be Addressed:   substance/tobacco abuse/use   cognitive/perceptual   mental health   decision-making   coping/stress  Readmission Within the Last 30 Days: no previous admission in last 30 days  Patient/Family Anticipated Services at Transition:   rehabilitation services   mental health services  Patient's Choice of Community Agency(s): [facility]  Patient/Family Anticipates Transition to: inpatient rehabilitation facility  Offered/Gave Vendor List: yes  Taken 7/11/2023 1605  Outpatient/Agency/Support Group Needs: residential services  Transition Support:   follow-up care discussed   community resources reviewed   crisis management plan promoted   crisis management plan verbalized   follow-up care coordinated  Anticipated Discharge Disposition: residential substance use unit         DATA:  Therapist met with Patient individually this date. Patient agreeable to discuss current treatment progress and discharge concerns.     CLINICAL MANUVERING/INTERVENTIONS:  Assisted Patient in processing session content; acknowledged and normalized Patient’s thoughts, feelings, and concerns by utilizing a person-centered approach in efforts to build appropriate rapport and a positive therapeutic relationship with open and honest communication. Allowed Patient to ventilate regarding current stressors and triggers for negative emotions and thoughts in  a safe nonjudgmental environment with unconditional positive regard, active listening skills, and empathy. Therapist implemented motivational interviewing techniques to assist Patient with exploring personal growth and change and discussed distress tolerance skills, self soothing techniques, and applied cognitive behavioral strategies to facilitate identification of maladaptive patterns of thinking and behavior.Therapist utilized dialectical behavior techniques to teach and model emotional regulation and relaxation methods. Therapist assisted Patient with identifying and implementing healthier coping strategies. Therapist assisted Patient with safety planning; Patient agreed to continue honest communication with Treatment Team while inpatient and identify any SI/HI.  Patient encouraged to seek nearest ER or contact 911 if danger to self or others post discharge.     ASSESSMENT:    Patient continues to receive treatment for alcohol detox. Patient reports moderate anxiety and depression, denies current SI/HI/AVH. Patient reports experiencing shakes, cramps, chills, fatigue, sweats, and diarrhea. Patient plans to admit to [facility] at discharge, agency to provide transportation.     PLAN:   Patient will continue stabilization. Patient will continue to receive services offered by Treatment Team.     Admit to [facility] when stable.

## 2023-07-14 NOTE — PROGRESS NOTES
Navigator is helping with the following referral:     Mook Beltran - [phone]  -Sent 7/11  -Transferred call so patient could complete phone screening.  7/12  -Patient approved. Bed available on Friday. Discharge summary will need to be faxed to [phone].  7/12  -Moved patients bed to Saturday. Treatment team to call Saturday morning to confirm discharge and set up  time. Discharge summary to be faxed to [phone].  7/14

## 2023-07-15 VITALS
BODY MASS INDEX: 21.82 KG/M2 | WEIGHT: 144 LBS | HEIGHT: 68 IN | SYSTOLIC BLOOD PRESSURE: 119 MMHG | OXYGEN SATURATION: 98 % | DIASTOLIC BLOOD PRESSURE: 83 MMHG | RESPIRATION RATE: 18 BRPM | HEART RATE: 78 BPM | TEMPERATURE: 96.8 F

## 2023-07-15 PROCEDURE — 99238 HOSP IP/OBS DSCHRG MGMT 30/<: CPT | Performed by: PSYCHIATRY & NEUROLOGY

## 2023-07-15 RX ADMIN — LOPERAMIDE HYDROCHLORIDE 2 MG: 2 CAPSULE ORAL at 14:45

## 2023-07-15 RX ADMIN — Medication 100 MG: at 08:20

## 2023-07-15 RX ADMIN — Medication 2 TABLET: at 08:20

## 2023-07-15 RX ADMIN — LOPERAMIDE HYDROCHLORIDE 2 MG: 2 CAPSULE ORAL at 11:43

## 2023-07-15 RX ADMIN — Medication 1 TABLET: at 08:20

## 2023-07-15 NOTE — PLAN OF CARE
Problem: Adult Behavioral Health Plan of Care  Goal: Plan of Care Review  Outcome: Adequate for Care Transition  Flowsheets (Taken 7/15/2023 8485)  Progress: improving  Plan of Care Reviewed With: patient  Patient Agreement with Plan of Care: agrees  Outcome Evaluation: Pt is being discharged today.   Goal Outcome Evaluation:  Plan of Care Reviewed With: patient  Patient Agreement with Plan of Care: agrees     Progress: improving  Outcome Evaluation: Pt is being discharged today.

## 2023-07-15 NOTE — PLAN OF CARE
Goal Outcome Evaluation:  Plan of Care Reviewed With: patient  Patient Agreement with Plan of Care: agrees     Progress: improving     Pt slept well, appetite is good, and participated in group. Pt given Imodium and Trazodone prn medications.

## 2023-07-15 NOTE — DISCHARGE SUMMARY
":  1980  MRN:  [id number]  Visit Number:  [id number]      Date of Admission:7/10/2023   Date of Discharge:  7/15/2023    Discharge Diagnosis:  Principal Problem:    Alcohol use disorder, severe, dependence  Active Problems:    Opioid use disorder, severe, dependence        Admission Diagnosis:  Polysubstance abuse [F19.10]  Alcohol use disorder, severe, dependence [F10.20]     MARCUS Silva is a 43 y.o. male who was admitted on 7/10/2023 with complaints of alcohol and opioid use and withdrawals.   For details please see H&P dated 7/10/23.     Hospital Course  Patient is a 43 y.o. male presented with alcohol and opioid use and withdrawals. The patient was started on Ativan detox given his history heavy alcohol use and withdrawals. The patient was  initially not started on medications for opioid withdrawals but he reported experiencing opioid withdrawals and was also started on clonidine detox. The patient made slow improvement in his withdrawals symptoms and by the end of his stay he reported feeling better and felt ready to be discharged.    Mental Status Exam upon discharge:   Mood \"better\"   Affect-congruent, appropriate, stable  Thought Content-goal directed, no delusional material present  Thought process-linear, organized.  Suicidality: No SI  Homicidality: No HI  Perception: No AH/VH    Procedures Performed         Consults:   Consults       No orders found from 2023 to 2023.            Pertinent Test Results:   Admission on 07/10/2023   Component Date Value Ref Range Status    QT Interval 07/10/2023 362  ms Final    QTC Interval 07/10/2023 445  ms Final   Admission on 2023, Discharged on 07/10/2023   Component Date Value Ref Range Status    Glucose 2023 85  65 - 99 mg/dL Final    BUN 2023 10  6 - 20 mg/dL Final    Creatinine 2023 0.64 (L)  0.76 - 1.27 mg/dL Final    Sodium 2023 143  136 - 145 mmol/L Final    Potassium 2023 4.0  3.5 - 5.2 mmol/L Final "    Slight hemolysis detected by analyzer. Results may be affected.    Chloride 07/09/2023 102  98 - 107 mmol/L Final    CO2 07/09/2023 24.5  22.0 - 29.0 mmol/L Final    Calcium 07/09/2023 9.4  8.6 - 10.5 mg/dL Final    Total Protein 07/09/2023 8.5  6.0 - 8.5 g/dL Final    Albumin 07/09/2023 4.5  3.5 - 5.2 g/dL Final    ALT (SGPT) 07/09/2023 26  1 - 41 U/L Final    AST (SGOT) 07/09/2023 117 (H)  1 - 40 U/L Final    Alkaline Phosphatase 07/09/2023 105  39 - 117 U/L Final    Total Bilirubin 07/09/2023 0.4  0.0 - 1.2 mg/dL Final    Globulin 07/09/2023 4.0  gm/dL Final    A/G Ratio 07/09/2023 1.1  g/dL Final    BUN/Creatinine Ratio 07/09/2023 15.6  7.0 - 25.0 Final    Anion Gap 07/09/2023 16.5 (H)  5.0 - 15.0 mmol/L Final    eGFR 07/09/2023 120.5  >60.0 mL/min/1.73 Final    Ethanol 07/09/2023 326 (H)  0 - 10 mg/dL Final    Ethanol % 07/09/2023 0.326  % Final    THC, Screen, Urine 07/09/2023 Negative  Negative Final    Phencyclidine (PCP), Urine 07/09/2023 Negative  Negative Final    Cocaine Screen, Urine 07/09/2023 Negative  Negative Final    Methamphetamine, Ur 07/09/2023 Negative  Negative Final    Opiate Screen 07/09/2023 Negative  Negative Final    Amphetamine Screen, Urine 07/09/2023 Negative  Negative Final    Benzodiazepine Screen, Urine 07/09/2023 Negative  Negative Final    Tricyclic Antidepressants Screen 07/09/2023 Negative  Negative Final    Methadone Screen, Urine 07/09/2023 Negative  Negative Final    Barbiturates Screen, Urine 07/09/2023 Negative  Negative Final    Oxycodone Screen, Urine 07/09/2023 Negative  Negative Final    Propoxyphene Screen 07/09/2023 Negative  Negative Final    Buprenorphine, Screen, Urine 07/09/2023 Negative  Negative Final    Color, UA 07/09/2023 Yellow  Yellow, Straw Final    Appearance, UA 07/09/2023 Clear  Clear Final    pH, UA 07/09/2023 5.5  5.0 - 8.0 Final    Specific Gravity, UA 07/09/2023 1.015  1.005 - 1.030 Final    Glucose, UA 07/09/2023 Negative  Negative Final     Ketones, UA 07/09/2023 Negative  Negative Final    Bilirubin, UA 07/09/2023 Negative  Negative Final    Blood, UA 07/09/2023 Negative  Negative Final    Protein, UA 07/09/2023 Negative  Negative Final    Leuk Esterase, UA 07/09/2023 Negative  Negative Final    Nitrite, UA 07/09/2023 Negative  Negative Final    Urobilinogen, UA 07/09/2023 0.2 E.U./dL  0.2 - 1.0 E.U./dL Final    WBC 07/09/2023 8.49  3.40 - 10.80 10*3/mm3 Final    RBC 07/09/2023 4.59  4.14 - 5.80 10*6/mm3 Final    Hemoglobin 07/09/2023 15.4  13.0 - 17.7 g/dL Final    Hematocrit 07/09/2023 46.4  37.5 - 51.0 % Final    MCV 07/09/2023 101.1 (H)  79.0 - 97.0 fL Final    MCH 07/09/2023 33.6 (H)  26.6 - 33.0 pg Final    MCHC 07/09/2023 33.2  31.5 - 35.7 g/dL Final    RDW 07/09/2023 13.1  12.3 - 15.4 % Final    RDW-SD 07/09/2023 49.1  37.0 - 54.0 fl Final    MPV 07/09/2023 8.8  6.0 - 12.0 fL Final    Platelets 07/09/2023 223  140 - 450 10*3/mm3 Final    Neutrophil % 07/09/2023 55.6  42.7 - 76.0 % Final    Lymphocyte % 07/09/2023 36.9  19.6 - 45.3 % Final    Monocyte % 07/09/2023 4.4 (L)  5.0 - 12.0 % Final    Eosinophil % 07/09/2023 1.8  0.3 - 6.2 % Final    Basophil % 07/09/2023 1.1  0.0 - 1.5 % Final    Immature Grans % 07/09/2023 0.2  0.0 - 0.5 % Final    Neutrophils, Absolute 07/09/2023 4.73  1.70 - 7.00 10*3/mm3 Final    Lymphocytes, Absolute 07/09/2023 3.13 (H)  0.70 - 3.10 10*3/mm3 Final    Monocytes, Absolute 07/09/2023 0.37  0.10 - 0.90 10*3/mm3 Final    Eosinophils, Absolute 07/09/2023 0.15  0.00 - 0.40 10*3/mm3 Final    Basophils, Absolute 07/09/2023 0.09  0.00 - 0.20 10*3/mm3 Final    Immature Grans, Absolute 07/09/2023 0.02  0.00 - 0.05 10*3/mm3 Final    nRBC 07/09/2023 0.0  0.0 - 0.2 /100 WBC Final    COVID19 07/09/2023 Not Detected  Not Detected - Ref. Range Final    Influenza A PCR 07/09/2023 Not Detected  Not Detected Final    Influenza B PCR 07/09/2023 Not Detected  Not Detected Final    Fentanyl, Urine 07/09/2023 Negative  Negative Final     Ethanol 07/10/2023 113 (H)  0 - 10 mg/dL Final    Ethanol % 07/10/2023 0.113  % Final    Ethanol 07/10/2023 64 (H)  0 - 10 mg/dL Final    Ethanol % 07/10/2023 0.064  % Final    Magnesium 07/10/2023 2.0  1.6 - 2.6 mg/dL Final        Condition on Discharge:  improved    Vital Signs  Temp:  [96.8 °F (36 °C)-97.8 °F (36.6 °C)] 96.8 °F (36 °C)  Heart Rate:  [] 78  Resp:  [18] 18  BP: (101-126)/(66-83) 119/83      Discharge Disposition:  Home or Self Care    Discharge Medications:     Discharge Medications      Patient Not Prescribed Medications Upon Discharge         Discharge Diet: Regular     Activity at Discharge: As tolerated     Follow-up Appointments    [address] [address]  [phone]     7/15/2023      Time spent in discharge: < 30 min    Clinician:   Art David MD  07/15/23  12:21 EDT

## 2024-08-06 NOTE — DISCHARGE INSTR - DIET
As tolerated   Detail Level: Detailed Quality 226: Preventive Care And Screening: Tobacco Use: Screening And Cessation Intervention: Patient screened for tobacco use and is an ex/non-smoker Quality 431: Preventive Care And Screening: Unhealthy Alcohol Use - Screening: Patient not identified as an unhealthy alcohol user when screened for unhealthy alcohol use using a systematic screening method